# Patient Record
Sex: FEMALE | Race: BLACK OR AFRICAN AMERICAN
[De-identification: names, ages, dates, MRNs, and addresses within clinical notes are randomized per-mention and may not be internally consistent; named-entity substitution may affect disease eponyms.]

---

## 2017-01-18 ENCOUNTER — HOSPITAL ENCOUNTER (EMERGENCY)
Dept: HOSPITAL 25 - ED | Age: 62
LOS: 1 days | Discharge: HOME | End: 2017-01-19
Payer: COMMERCIAL

## 2017-01-18 VITALS — SYSTOLIC BLOOD PRESSURE: 121 MMHG | DIASTOLIC BLOOD PRESSURE: 64 MMHG

## 2017-01-18 DIAGNOSIS — F17.210: ICD-10-CM

## 2017-01-18 DIAGNOSIS — M54.9: ICD-10-CM

## 2017-01-18 DIAGNOSIS — R10.9: ICD-10-CM

## 2017-01-18 DIAGNOSIS — R07.9: Primary | ICD-10-CM

## 2017-01-18 LAB
ALBUMIN SERPL BCG-MCNC: 4 G/DL (ref 3.2–5.2)
ALP SERPL-CCNC: 87 U/L (ref 34–104)
ALT SERPL W P-5'-P-CCNC: 14 U/L (ref 7–52)
AMYLASE SERPL-CCNC: 18 U/L (ref 29–103)
ANION GAP SERPL CALC-SCNC: 8 MMOL/L (ref 2–11)
AST SERPL-CCNC: 13 U/L (ref 13–39)
BUN SERPL-MCNC: 8 MG/DL (ref 6–24)
BUN/CREAT SERPL: 12.7 (ref 8–20)
CALCIUM SERPL-MCNC: 9.1 MG/DL (ref 8.6–10.3)
CHLORIDE SERPL-SCNC: 101 MMOL/L (ref 101–111)
GLOBULIN SER CALC-MCNC: 3.1 G/DL (ref 2–4)
GLUCOSE SERPL-MCNC: 89 MG/DL (ref 70–100)
HCO3 SERPL-SCNC: 28 MMOL/L (ref 22–32)
HCT VFR BLD AUTO: 38 % (ref 35–47)
HGB BLD-MCNC: 12.3 G/DL (ref 12–16)
LIPASE SERPL-CCNC: 11 U/L (ref 11–82)
MAGNESIUM SERPL-MCNC: 1.8 MG/DL (ref 1.9–2.7)
MCH RBC QN AUTO: 26 PG (ref 27–31)
MCHC RBC AUTO-ENTMCNC: 32 G/DL (ref 31–36)
MCV RBC AUTO: 80 FL (ref 80–97)
POTASSIUM SERPL-SCNC: 3.8 MMOL/L (ref 3.5–5)
PROT SERPL-MCNC: 7.1 G/DL (ref 6.4–8.9)
RBC # BLD AUTO: 4.8 10^6/UL (ref 4–5.4)
SODIUM SERPL-SCNC: 137 MMOL/L (ref 133–145)
TROPONIN I SERPL-MCNC: 0 NG/ML (ref ?–0.04)
WBC # BLD AUTO: 10.6 10^3/UL (ref 3.5–10.8)

## 2017-01-18 PROCEDURE — 93005 ELECTROCARDIOGRAM TRACING: CPT

## 2017-01-18 PROCEDURE — 85025 COMPLETE CBC W/AUTO DIFF WBC: CPT

## 2017-01-18 PROCEDURE — 99283 EMERGENCY DEPT VISIT LOW MDM: CPT

## 2017-01-18 PROCEDURE — 96374 THER/PROPH/DIAG INJ IV PUSH: CPT

## 2017-01-18 PROCEDURE — 83735 ASSAY OF MAGNESIUM: CPT

## 2017-01-18 PROCEDURE — 84484 ASSAY OF TROPONIN QUANT: CPT

## 2017-01-18 PROCEDURE — 36415 COLL VENOUS BLD VENIPUNCTURE: CPT

## 2017-01-18 PROCEDURE — 81003 URINALYSIS AUTO W/O SCOPE: CPT

## 2017-01-18 PROCEDURE — 82553 CREATINE MB FRACTION: CPT

## 2017-01-18 PROCEDURE — 96375 TX/PRO/DX INJ NEW DRUG ADDON: CPT

## 2017-01-18 PROCEDURE — 96361 HYDRATE IV INFUSION ADD-ON: CPT

## 2017-01-18 PROCEDURE — 83690 ASSAY OF LIPASE: CPT

## 2017-01-18 PROCEDURE — 80053 COMPREHEN METABOLIC PANEL: CPT

## 2017-01-18 PROCEDURE — 71020: CPT

## 2017-01-18 PROCEDURE — 83605 ASSAY OF LACTIC ACID: CPT

## 2017-01-18 PROCEDURE — 82150 ASSAY OF AMYLASE: CPT

## 2017-01-18 NOTE — RAD
INDICATION: Chest pain between the shoulder blades persistent all day. Chest pressure.



COMPARISON: October 9, 2015 CT abdomen and September 24, 2013 chest radiograph.



TECHNIQUE:  Dual energy PA and routine lateral views of the chest were obtained.



REPORT: Elevated lung volumes with increased AP thoracic diameter. No alveolar

consolidation, focal pulmonary lesion, pleural effusion, pneumothorax. Negative for

cardiomegaly. Unremarkable central pulmonary vasculature. Fusiform midline retrocardiac

density corresponds with a hiatal hernia based on correlation with prior CT.



IMPRESSION: Stigmata of probable chronic obstructive pulmonary disease. No acute

cardiopulmonary process evident.

## 2017-03-20 ENCOUNTER — HOSPITAL ENCOUNTER (EMERGENCY)
Dept: HOSPITAL 25 - ED | Age: 62
Discharge: HOME | End: 2017-03-20
Payer: COMMERCIAL

## 2017-03-20 VITALS — DIASTOLIC BLOOD PRESSURE: 71 MMHG | SYSTOLIC BLOOD PRESSURE: 119 MMHG

## 2017-03-20 DIAGNOSIS — F17.210: ICD-10-CM

## 2017-03-20 DIAGNOSIS — R10.84: Primary | ICD-10-CM

## 2017-03-20 DIAGNOSIS — M54.9: ICD-10-CM

## 2017-03-20 LAB
ALBUMIN SERPL BCG-MCNC: 3.9 G/DL (ref 3.2–5.2)
ALP SERPL-CCNC: 83 U/L (ref 34–104)
ALT SERPL W P-5'-P-CCNC: 14 U/L (ref 7–52)
AMYLASE SERPL-CCNC: 17 U/L (ref 29–103)
ANION GAP SERPL CALC-SCNC: 5 MMOL/L (ref 2–11)
AST SERPL-CCNC: 13 U/L (ref 13–39)
BUN SERPL-MCNC: 10 MG/DL (ref 6–24)
BUN/CREAT SERPL: 15.6 (ref 8–20)
CALCIUM SERPL-MCNC: 9.1 MG/DL (ref 8.6–10.3)
CHLORIDE SERPL-SCNC: 103 MMOL/L (ref 101–111)
GLOBULIN SER CALC-MCNC: 3.2 G/DL (ref 2–4)
GLUCOSE SERPL-MCNC: 100 MG/DL (ref 70–100)
HCO3 SERPL-SCNC: 30 MMOL/L (ref 22–32)
HCT VFR BLD AUTO: 39 % (ref 35–47)
HGB BLD-MCNC: 12.8 G/DL (ref 12–16)
LIPASE SERPL-CCNC: 13 U/L (ref 11–82)
MCH RBC QN AUTO: 26 PG (ref 27–31)
MCHC RBC AUTO-ENTMCNC: 33 G/DL (ref 31–36)
MCV RBC AUTO: 80 FL (ref 80–97)
POTASSIUM SERPL-SCNC: 4.1 MMOL/L (ref 3.5–5)
PROT SERPL-MCNC: 7.1 G/DL (ref 6.4–8.9)
RBC # BLD AUTO: 4.87 10^6/UL (ref 4–5.4)
SODIUM SERPL-SCNC: 138 MMOL/L (ref 133–145)
TROPONIN I SERPL-MCNC: 0.01 NG/ML (ref ?–0.04)
WBC # BLD AUTO: 7.2 10^3/UL (ref 3.5–10.8)

## 2017-03-20 PROCEDURE — 36415 COLL VENOUS BLD VENIPUNCTURE: CPT

## 2017-03-20 PROCEDURE — 99283 EMERGENCY DEPT VISIT LOW MDM: CPT

## 2017-03-20 PROCEDURE — 83690 ASSAY OF LIPASE: CPT

## 2017-03-20 PROCEDURE — 86140 C-REACTIVE PROTEIN: CPT

## 2017-03-20 PROCEDURE — 83605 ASSAY OF LACTIC ACID: CPT

## 2017-03-20 PROCEDURE — 82150 ASSAY OF AMYLASE: CPT

## 2017-03-20 PROCEDURE — 96375 TX/PRO/DX INJ NEW DRUG ADDON: CPT

## 2017-03-20 PROCEDURE — 93005 ELECTROCARDIOGRAM TRACING: CPT

## 2017-03-20 PROCEDURE — 84484 ASSAY OF TROPONIN QUANT: CPT

## 2017-03-20 PROCEDURE — 85025 COMPLETE CBC W/AUTO DIFF WBC: CPT

## 2017-03-20 PROCEDURE — 80053 COMPREHEN METABOLIC PANEL: CPT

## 2017-03-20 PROCEDURE — 81003 URINALYSIS AUTO W/O SCOPE: CPT

## 2017-03-20 PROCEDURE — 96374 THER/PROPH/DIAG INJ IV PUSH: CPT

## 2017-03-20 PROCEDURE — 74174 CTA ABD&PLVS W/CONTRAST: CPT

## 2017-03-20 PROCEDURE — 71275 CT ANGIOGRAPHY CHEST: CPT

## 2017-03-20 NOTE — ED
Raymon ROSS Alok, scribed for Leticia Streeter MD on 03/20/17 at 1308 .





Abdominal Pain/Female





- HPI Summary


HPI Summary: 


60 y/o female presents to the ED with c/o of epigastric and lower quadrant abd 

pain radiating to upper back. Pt states that her symptoms began 1 week ago 

starting with a sharp shoulder pain which worsened 3 days ago to burning abd 

pain and back pain worse than baseline, currently registering at a 10/10 in 

severity. Pt denies any aggravation of her symptoms from deep breaths and 

states that nothing makes her symptoms better/worse. Pt also denies any N/V Pt 

has PMHx of GERD, hyperthyroid, and Fibromyalgia, as well as depression and 

anxiety. Pt denies any medications, recent travels, or PMHx of MI.








- History of Current Complaint


Chief Complaint: EDAbdPain


Stated Complaint: ABD PAIN / BACK PAIN


Time Seen by Provider: 03/20/17 12:18


Hx Obtained From: Patient


Pregnant?: No


Onset/Duration: Gradual Onset, Lasting Days, Still Present


Timing: Constant


Severity Initially: Moderate


Severity Currently: Moderate


Pain Intensity: 10


Pain Scale Used: 0-10 Numeric


Location: Discrete At: RLQ, Discrete At: LLQ, Epigastric, Other - Shoulder


Radiates: Yes


Radiates to: Back


Character: Sharp, Burning


Aggravating Factor(s): Nothing


Alleviating Factor(s): Nothing


Associated Signs and Symptoms: Positive: Back Pain.  Negative: Fever, Nausea, 

Vomiting


Allergies/Adverse Reactions: 


 Allergies











Allergy/AdvReac Type Severity Reaction Status Date / Time


 


Sulfa Drugs Allergy Intermediate Hives Verified 03/20/17 10:53














PMH/Surg Hx/FS Hx/Imm Hx


Endocrine/Hematology History: Reports: Hx Thyroid Disease - HYPOTHYROID


   Denies: Hx Diabetes, Hx Anemia


Cardiovascular History: Reports: Hx Valvular Heart Disease - mitral valve 

prolapse


   Denies: Hx Congestive Heart Failure, Hx Hypertension, Hx Pacemaker/ICD


GI History: Reports: Hx Gastroesophageal Reflux Disease - ON MEDICATION FOR, 

Other GI Disorders - GERD, ? IBS


   Denies: Hx Jaundice


 History: 


   Denies: Hx Dialysis, Hx Renal Disease


Musculoskeletal History: Reports: Hx Arthritis - OSTEO, BACK AND KNEES, Hx 

Orthopedic Injury


Sensory History: Reports: Hx Contacts or Glasses


   Denies: Hx Hearing Aid


Opthamlomology History: Reports: Hx Contacts or Glasses


Neurological History: Reports: Other Neuro Impairments/Disorders - L5-S1 PAIN 

INJECTION


Psychiatric History: Reports: Hx Anxiety - ON MEDICATION FOR, Hx Depression - 

ON MEDICATION FOR


   Denies: Hx Panic Disorder





- Surgical History


Surgery Procedure, Year, and Place: SEVERAL FOOT SURGERIES-([facility]) [facility] 

REPAIR- Lt [facility] -Partial thyroidectomy (cyst) Rt HAND -


Hx Anesthesia Reactions: No


Infectious Disease History: No


Infectious Disease History: 


   Denies: Traveled Outside the [place] in Last 30 Days





- Family History


Known Family History: Positive: Renal Disease, Other - arthritis, CA





- Social History


Lives: Alone


Alcohol Use: Occasionally


Alcohol Amount: 2-3 beers a couple times per week- pt seems evasive


Substance Use Type: Reports: Prescribed


Substance Use Comment - Amount & Last Used: Tramadol ER


Hx Tobacco Use: Yes


Smoking Status (MU): Heavy Every Day Tobacco Smoker


Type: Cigarettes


Amount Used/How Often: 1 ppd


Have You Smoked in the Last Year: Yes





Review of Systems


Negative: Fever


Positive: Abdominal Pain.  Negative: Vomiting, Nausea


Positive: Other - Back Pain, Shoulder Pain


All Other Systems Reviewed And Are Negative: Yes





Physical Exam


Triage Information Reviewed: Yes


Vital Signs On Initial Exam: 


 Initial Vitals











Temp Pulse Resp BP Pulse Ox


 


 96.2 F   93   20   154/80   97 


 


 03/20/17 10:54  03/20/17 10:54  03/20/17 10:54  03/20/17 10:54  03/20/17 10:54











Vital Signs Reviewed: Yes


Appearance: Positive: Well-Appearing, No Pain Distress


Skin: Positive: Warm, Skin Color Reflects Adequate Perfusion, Dry


Eyes: Positive: EOMI, ROSAMARIA


ENT: Positive: Pharynx normal, TMs normal


Neck: Positive: Supple, Nontender


Respiratory/Lung Sounds: Positive: Clear to Auscultation, Breath Sounds 

Present.  Negative: Rales, Rhonchi, Wheezes


Cardiovascular: Positive: RRR.  Negative: Murmur, Rub, Other - Gallops


Abdomen Description: Positive: Soft, Other: - Diffuse Belly Pain


Bowel Sounds: Positive: Present


Musculoskeletal: Positive: Strength/ROM Intact.  Negative: Edema Left, Edema 

Right


Neurological: Positive: Sensory/Motor Intact, Alert, Oriented to Person Place, 

Time, CN Intact II-III


Psychiatric: Positive: Affect/Mood Appropriate





Diagnostics





- Vital Signs


 Vital Signs











  Temp Pulse Resp BP Pulse Ox


 


 03/20/17 10:54  96.2 F  93  20  154/80  97














- Laboratory


Lab Results: 


 Lab Results











  03/20/17 03/20/17 03/20/17 Range/Units





  12:43 12:43 12:43 


 


WBC  7.2    (3.5-10.8)  10^3/ul


 


RBC  4.87    (4.0-5.4)  10^6/ul


 


Hgb  12.8    (12.0-16.0)  g/dl


 


Hct  39    (35-47)  %


 


MCV  80    (80-97)  fL


 


MCH  26 L    (27-31)  pg


 


MCHC  33    (31-36)  g/dl


 


RDW  17 H    (10.5-15)  %


 


Plt Count  226    (150-450)  10^3/ul


 


MPV  8    (7.4-10.4)  um3


 


Neut % (Auto)  47.4    (38-83)  %


 


Lymph % (Auto)  43.9    (25-47)  %


 


Mono % (Auto)  6.3    (1-9)  %


 


Eos % (Auto)  1.6    (0-6)  %


 


Baso % (Auto)  0.8    (0-2)  %


 


Absolute Neuts (auto)  3.4    (1.5-7.7)  10^3/ul


 


Absolute Lymphs (auto)  3.2    (1.0-4.8)  10^3/ul


 


Absolute Monos (auto)  0.5    (0-0.8)  10^3/ul


 


Absolute Eos (auto)  0.1    (0-0.6)  10^3/ul


 


Absolute Basos (auto)  0.1    (0-0.2)  10^3/ul


 


Absolute Nucleated RBC  0    10^3/ul


 


Nucleated RBC %  0    


 


Sodium   138   (133-145)  mmol/L


 


Potassium   4.1   (3.5-5.0)  mmol/L


 


Chloride   103   (101-111)  mmol/L


 


Carbon Dioxide   30   (22-32)  mmol/L


 


Anion Gap   5   (2-11)  mmol/L


 


BUN   10   (6-24)  mg/dL


 


Creatinine   0.64   (0.51-0.95)  mg/dL


 


Est GFR ( Amer)   121.7   (>60)  


 


Est GFR (Non-Af Amer)   94.7   (>60)  


 


BUN/Creatinine Ratio   15.6   (8-20)  


 


Glucose   100   ()  mg/dL


 


Lactic Acid    1.3  (0.5-2.0)  mmol/L


 


Calcium   9.1   (8.6-10.3)  mg/dL


 


Total Bilirubin   0.40   (0.2-1.0)  mg/dL


 


AST   13   (13-39)  U/L


 


ALT   14   (7-52)  U/L


 


Alkaline Phosphatase   83   ()  U/L


 


Troponin I   0.01   (<0.04)  ng/mL


 


C-Reactive Protein   9.27 H   (< 5.00)  mg/L


 


Total Protein   7.1   (6.4-8.9)  g/dL


 


Albumin   3.9   (3.2-5.2)  g/dL


 


Globulin   3.2   (2-4)  g/dL


 


Albumin/Globulin Ratio   1.2   (1-3)  


 


Amylase   17 L   ()  U/L


 


Lipase   13   (11.0-82.0)  U/L


 


Urine Color     


 


Urine Appearance     


 


Urine pH     (5-9)  


 


Ur Specific Gravity     (1.010-1.030)  


 


Urine Protein     (Negative)  


 


Urine Ketones     (Negative)  


 


Urine Blood     (Negative)  


 


Urine Nitrate     (Negative)  


 


Urine Bilirubin     (Negative)  


 


Urine Urobilinogen     (Negative)  


 


Ur Leukocyte Esterase     (Negative)  


 


Urine Glucose     (Negative)  














  03/20/17 Range/Units





  15:43 


 


WBC   (3.5-10.8)  10^3/ul


 


RBC   (4.0-5.4)  10^6/ul


 


Hgb   (12.0-16.0)  g/dl


 


Hct   (35-47)  %


 


MCV   (80-97)  fL


 


MCH   (27-31)  pg


 


MCHC   (31-36)  g/dl


 


RDW   (10.5-15)  %


 


Plt Count   (150-450)  10^3/ul


 


MPV   (7.4-10.4)  um3


 


Neut % (Auto)   (38-83)  %


 


Lymph % (Auto)   (25-47)  %


 


Mono % (Auto)   (1-9)  %


 


Eos % (Auto)   (0-6)  %


 


Baso % (Auto)   (0-2)  %


 


Absolute Neuts (auto)   (1.5-7.7)  10^3/ul


 


Absolute Lymphs (auto)   (1.0-4.8)  10^3/ul


 


Absolute Monos (auto)   (0-0.8)  10^3/ul


 


Absolute Eos (auto)   (0-0.6)  10^3/ul


 


Absolute Basos (auto)   (0-0.2)  10^3/ul


 


Absolute Nucleated RBC   10^3/ul


 


Nucleated RBC %   


 


Sodium   (133-145)  mmol/L


 


Potassium   (3.5-5.0)  mmol/L


 


Chloride   (101-111)  mmol/L


 


Carbon Dioxide   (22-32)  mmol/L


 


Anion Gap   (2-11)  mmol/L


 


BUN   (6-24)  mg/dL


 


Creatinine   (0.51-0.95)  mg/dL


 


Est GFR ( Amer)   (>60)  


 


Est GFR (Non-Af Amer)   (>60)  


 


BUN/Creatinine Ratio   (8-20)  


 


Glucose   ()  mg/dL


 


Lactic Acid   (0.5-2.0)  mmol/L


 


Calcium   (8.6-10.3)  mg/dL


 


Total Bilirubin   (0.2-1.0)  mg/dL


 


AST   (13-39)  U/L


 


ALT   (7-52)  U/L


 


Alkaline Phosphatase   ()  U/L


 


Troponin I   (<0.04)  ng/mL


 


C-Reactive Protein   (< 5.00)  mg/L


 


Total Protein   (6.4-8.9)  g/dL


 


Albumin   (3.2-5.2)  g/dL


 


Globulin   (2-4)  g/dL


 


Albumin/Globulin Ratio   (1-3)  


 


Amylase   ()  U/L


 


Lipase   (11.0-82.0)  U/L


 


Urine Color  Yellow  


 


Urine Appearance  Clear  


 


Urine pH  6.0  (5-9)  


 


Ur Specific Gravity  > 1.060 H  (1.010-1.030)  


 


Urine Protein  Negative  (Negative)  


 


Urine Ketones  Negative  (Negative)  


 


Urine Blood  Negative  (Negative)  


 


Urine Nitrate  Negative  (Negative)  


 


Urine Bilirubin  Negative  (Negative)  


 


Urine Urobilinogen  Negative  (Negative)  


 


Ur Leukocyte Esterase  Negative  (Negative)  


 


Urine Glucose  Negative  (Negative)  











Result Diagrams: 


 03/20/17 12:43





 03/20/17 12:43


Lab Statement: Any lab studies that have been ordered have been reviewed, and 

results considered in the medical decision making process.





- CT


  ** Chest/Abd/Pelvis CT


CT Interpretation: Positive (See Comments) - CHEST IMPRESSION:  1. Normal 

diameter thoracic aorta with minimal atherosclerotic plaque. Negative for 

dissection of the thoracic aorta. 2. Large RIGHT paratracheal lymph node 

measuring up to 2.8 cm short axis diameter and up to 4.4 cm cephalocaudal.  

This lymph node is new compared with a CT exam of the neck from March 27, 2015. 

Consider small cell lung carcinoma as well as metastatic lesions and 

lymphoproliferative disorders. ABDOMEN PELVIS IMPRESSION:  1. Negative for 

aneurysm or dissection of the abdominal aorta. 2. Negative for lymphadenopathy. 

3. Moderate sliding-type hiatal hernia without change. Mild colonic 

diverticulosis without evidence for acute diverticulitis.


CT Interpretation Completed By: Radiologist





- EKG


  ** 1231


Cardiac Rate: NL


EKG Rhythm: Sinus Rhythm - 82 bpm


Ectopy: PVCs - One


EKG Interpretation: No Q-waves, No ST Elevation


EKG Comparison: No Significant Change - Since 1/18/16





Abdominal Pain Fem Course/Dx





- Course


Course Of Treatment: 62 yo female with pain she described as initially between 

her shoulder blades a few days ago and now as belly pain radiating to back that 

moves to different places. her pain is a 10/10 she already had an u/s of the 

abdominal aorta that was negative.  CTA chest/abd/pelvis shows rt paratracheal 

node. Pt is aware of this finding and so is Dr. Chaudhry, her pain seems likely 

unrelated and Dr. Chaudhry is planning on seeing the pt as an outpt in the next 1-2 

days. She will be going with a short course of pain meds





- Diagnoses


Differential Diagnosis: Positive: Other - Incidental finding: Right 

paratracheal mass


Provider Diagnoses: 


 Abdominal pain, paratracheal lymph node








- Provider Notifications


Discussed Care Of Patient With: Dr Chaudhry @ [id number]





Discharge





- Discharge Plan


Condition: Stable


Disposition: HOME


Prescriptions: 


oxyCODONE/Acetamin 5/325 MG* [Percocet 5/325 TAB*] 1 tab PO Q8H PRN #10 tab MDD 

3


 PRN Reason: Pain


Referrals: 


Serjio Chaudhry MD [Primary Care Provider] - 





The documentation as recorded by the Raymon shirley Alok accurately reflects 

the service I personally performed and the decisions made by me, Leticia Streeter MD.

## 2017-03-20 NOTE — RAD
INDICATION: Back pain radiating to the chest and abdomen. Hypertension and tobacco use.



COMPARISON: January 18, 2017 chest radiograph and October 09, 2015 abdomen CT.



TECHNIQUE: Multidetector CT images were obtained from the lung apices to the ischial

tuberosities with 100 mL Omnipaque 350 IV contrast.  No oral contrast administered.

Multiplanar reformation with maximum intensity projection and 3-D arterial volume

rendering.



CHEST REPORT: Rarefaction of interstitial markings consistent with emphysema. No focal

pulmonary lesions evident. Negative for pleural effusions. Small fat-containing LEFT

posterior medial diaphragmatic hernia without change.



Large RIGHT paratracheal lymph node measuring up to 2.8 cm short axis diameter and up to

4.4 cm cephalocaudal. Upper normal 1 cm short axis diameter RIGHT suprahilar lymph node.

Negative for axillary or visible supraclavicular adenopathy. Negative for cardiomegaly or

pericardial effusion. Normal diameter thoracic aorta with minimal atherosclerotic plaque.

Negative for dissection of the thoracic aorta. Variant common origin of the LEFT common

carotid and RIGHT brachiocephalic arteries. The common carotid arteries are tortuous and

ascend in the respective para midline positions at the level of the larynx.



Negative for suspicious thoracic osseous lesions.



CHEST IMPRESSION: 

1. Normal diameter thoracic aorta with minimal atherosclerotic plaque. Negative for

dissection of the thoracic aorta.

2. Large RIGHT paratracheal lymph node measuring up to 2.8 cm short axis diameter and up

to 4.4 cm cephalocaudal.  This lymph node is new compared with a CT exam of the neck from

March 27, 2015. Consider small cell lung carcinoma as well as metastatic lesions and

lymphoproliferative disorders.



ABDOMEN PELVIS REPORT: Assessment of the abdominal pelvic viscera is limited due to

arterial phase only series. No CT abnormality of the liver, gallbladder, pancreas, spleen

evident accounting for arterial phase enhancement.



Moderate sliding-type hiatal hernia without change. No additional CT abnormality of the

upper GI. No CT abnormality of the small bowel loops or appendix visualized at the LEFT

lower quadrant reference axial images 185-197. Mild colonic diverticulosis without

evidence for acute diverticulitis. Negative for ascites or free air. Small fat-containing

supraumbilical ventral hernia without evidence for inflammatory change or increase in size

compared with the prior exam. Unchanged small fat-containing umbilical hernia without

acute finding.



Normal adrenal glands. Unremarkable kidneys with symmetric cortical enhancement. Negative

for hydronephrosis. Unremarkable ureters and urinary bladder. No abnormality of the uterus

or adnexal regions evident.



Negative for lymphadenopathy. Normal diameter abdominal aorta and iliac arteries with

minimal atherosclerotic plaque. Negative for aortic dissection.



Negative for suspicious focal osseous lesions. Bilateral L5 spondylolysis and grade 2-3

L5-S1 anterolisthesis without gross change. Advanced degenerative spondylosis at L3-L4 and

L5-S1 with associated reactive endplate change without significant interval change. 



ABDOMEN PELVIS IMPRESSION: 

1. Negative for aneurysm or dissection of the abdominal aorta.

2. Negative for lymphadenopathy.

3. Moderate sliding-type hiatal hernia without change. Mild colonic diverticulosis without

evidence for acute diverticulitis.



Results discussed with Dr. Streeter 3/20/2017 2:32 PM EDT

## 2017-03-24 ENCOUNTER — HOSPITAL ENCOUNTER (OUTPATIENT)
Dept: HOSPITAL 25 - ED | Age: 62
Setting detail: OBSERVATION
LOS: 1 days | Discharge: HOME | End: 2017-03-25
Attending: INTERNAL MEDICINE | Admitting: INTERNAL MEDICINE
Payer: COMMERCIAL

## 2017-03-24 DIAGNOSIS — R07.9: Primary | ICD-10-CM

## 2017-03-24 DIAGNOSIS — Z79.899: ICD-10-CM

## 2017-03-24 DIAGNOSIS — F41.9: ICD-10-CM

## 2017-03-24 DIAGNOSIS — M79.7: ICD-10-CM

## 2017-03-24 DIAGNOSIS — G47.00: ICD-10-CM

## 2017-03-24 DIAGNOSIS — Z88.2: ICD-10-CM

## 2017-03-24 DIAGNOSIS — K44.9: ICD-10-CM

## 2017-03-24 DIAGNOSIS — R10.9: ICD-10-CM

## 2017-03-24 DIAGNOSIS — F17.210: ICD-10-CM

## 2017-03-24 DIAGNOSIS — F32.9: ICD-10-CM

## 2017-03-24 DIAGNOSIS — R55: ICD-10-CM

## 2017-03-24 DIAGNOSIS — R94.31: ICD-10-CM

## 2017-03-24 DIAGNOSIS — R59.1: ICD-10-CM

## 2017-03-24 DIAGNOSIS — E03.9: ICD-10-CM

## 2017-03-24 DIAGNOSIS — K21.9: ICD-10-CM

## 2017-03-24 DIAGNOSIS — E66.9: ICD-10-CM

## 2017-03-24 DIAGNOSIS — R79.89: ICD-10-CM

## 2017-03-24 DIAGNOSIS — R61: ICD-10-CM

## 2017-03-24 LAB
ALBUMIN SERPL BCG-MCNC: 3.6 G/DL (ref 3.2–5.2)
ALP SERPL-CCNC: 93 U/L (ref 34–104)
ALT SERPL W P-5'-P-CCNC: 16 U/L (ref 7–52)
ANION GAP SERPL CALC-SCNC: 8 MMOL/L (ref 2–11)
AST SERPL-CCNC: 18 U/L (ref 13–39)
BUN SERPL-MCNC: 11 MG/DL (ref 6–24)
BUN/CREAT SERPL: 12.8 (ref 8–20)
CALCIUM SERPL-MCNC: 8.8 MG/DL (ref 8.6–10.3)
CHLORIDE SERPL-SCNC: 104 MMOL/L (ref 101–111)
FERRITIN SERPL IA-MCNC: < 10 NG/ML (ref 11–307)
GLOBULIN SER CALC-MCNC: 2.9 G/DL (ref 2–4)
GLUCOSE SERPL-MCNC: 129 MG/DL (ref 70–100)
HCO3 SERPL-SCNC: 26 MMOL/L (ref 22–32)
HCT VFR BLD AUTO: 36 % (ref 35–47)
HGB BLD-MCNC: 11.5 G/DL (ref 12–16)
MCH RBC QN AUTO: 26 PG (ref 27–31)
MCHC RBC AUTO-ENTMCNC: 32 G/DL (ref 31–36)
MCV RBC AUTO: 81 FL (ref 80–97)
POTASSIUM SERPL-SCNC: 4 MMOL/L (ref 3.5–5)
PROT SERPL-MCNC: 6.5 G/DL (ref 6.4–8.9)
RBC # BLD AUTO: 4.44 10^6/UL (ref 4–5.4)
SODIUM SERPL-SCNC: 138 MMOL/L (ref 133–145)
TROPONIN I SERPL-MCNC: 0 NG/ML (ref ?–0.04)
TSH SERPL-ACNC: 0.18 MCIU/ML (ref 0.34–5.6)
VIT B12 SERPL-MCNC: 908 PG/ML (ref 180–914)
WBC # BLD AUTO: 11.2 10^3/UL (ref 3.5–10.8)

## 2017-03-24 PROCEDURE — 85025 COMPLETE CBC W/AUTO DIFF WBC: CPT

## 2017-03-24 PROCEDURE — 36415 COLL VENOUS BLD VENIPUNCTURE: CPT

## 2017-03-24 PROCEDURE — 87086 URINE CULTURE/COLONY COUNT: CPT

## 2017-03-24 PROCEDURE — 85027 COMPLETE CBC AUTOMATED: CPT

## 2017-03-24 PROCEDURE — 81015 MICROSCOPIC EXAM OF URINE: CPT

## 2017-03-24 PROCEDURE — 93017 CV STRESS TEST TRACING ONLY: CPT

## 2017-03-24 PROCEDURE — 82728 ASSAY OF FERRITIN: CPT

## 2017-03-24 PROCEDURE — G0378 HOSPITAL OBSERVATION PER HR: HCPCS

## 2017-03-24 PROCEDURE — 84443 ASSAY THYROID STIM HORMONE: CPT

## 2017-03-24 PROCEDURE — 99283 EMERGENCY DEPT VISIT LOW MDM: CPT

## 2017-03-24 PROCEDURE — 76705 ECHO EXAM OF ABDOMEN: CPT

## 2017-03-24 PROCEDURE — 99406 BEHAV CHNG SMOKING 3-10 MIN: CPT

## 2017-03-24 PROCEDURE — 93005 ELECTROCARDIOGRAM TRACING: CPT

## 2017-03-24 PROCEDURE — 84481 FREE ASSAY (FT-3): CPT

## 2017-03-24 PROCEDURE — 80053 COMPREHEN METABOLIC PANEL: CPT

## 2017-03-24 PROCEDURE — 0DJ08ZZ INSPECTION OF UPPER INTESTINAL TRACT, VIA NATURAL OR ARTIFICIAL OPENING ENDOSCOPIC: ICD-10-PCS | Performed by: INTERNAL MEDICINE

## 2017-03-24 PROCEDURE — 78452 HT MUSCLE IMAGE SPECT MULT: CPT

## 2017-03-24 PROCEDURE — 84484 ASSAY OF TROPONIN QUANT: CPT

## 2017-03-24 PROCEDURE — 96361 HYDRATE IV INFUSION ADD-ON: CPT

## 2017-03-24 PROCEDURE — 81003 URINALYSIS AUTO W/O SCOPE: CPT

## 2017-03-24 PROCEDURE — 70450 CT HEAD/BRAIN W/O DYE: CPT

## 2017-03-24 PROCEDURE — A9502 TC99M TETROFOSMIN: HCPCS

## 2017-03-24 PROCEDURE — 86140 C-REACTIVE PROTEIN: CPT

## 2017-03-24 PROCEDURE — 82607 VITAMIN B-12: CPT

## 2017-03-24 PROCEDURE — 85652 RBC SED RATE AUTOMATED: CPT

## 2017-03-24 PROCEDURE — 96360 HYDRATION IV INFUSION INIT: CPT

## 2017-03-24 PROCEDURE — 84439 ASSAY OF FREE THYROXINE: CPT

## 2017-03-24 RX ADMIN — PREGABALIN SCH: 50 CAPSULE ORAL at 16:48

## 2017-03-24 RX ADMIN — SODIUM CHLORIDE SCH MLS/HR: 900 IRRIGANT IRRIGATION at 14:41

## 2017-03-24 RX ADMIN — PREGABALIN SCH: 50 CAPSULE ORAL at 21:39

## 2017-03-24 NOTE — RAD
Indication: Abdominal pain.



Real-time sonography of the right upper quadrant was performed.



The liver is lobulated in contour. There are no focal lesions or intrahepatic duct

dilatation noted. Common duct measures 5 mm. The gallbladder demonstrates gallbladder

polyp in the nondependent portion of the gallbladder. No pericholecystic fluid or wall

thickening is identified.



The right kidney measures 10.2 x 4.4 x 5.4 cm with no hydronephrosis.



The pancreas head, neck and proximal body demonstrates no mass or pancreatic duct

dilatation.



Aorta and inferior vena cava are unremarkable.



IMPRESSION: Probable gallbladder polyp without evidence of pericholecystic fluid or wall

thickening.

## 2017-03-24 NOTE — HP
CC:  Serjio Chaudhry MD

 

HISTORY AND PHYSICAL:

 

DATE OF ADMISSION:  17

 

HISTORY OF PRESENT ILLNESS:  Deepali Kang is a 61-year-old woman admitted with 
chest and abdominal pain and near syncope.  She also has an enlarged lymph node 
on the CT of her chest.  The patient has been having recent upper abdominal 
pain going into her chest, shoulder blades, rib cage.  She was visiting her 
sister in [place] and it got worse after she came home, so she came to the 
emergency room on 17.  At that time, she underwent a workup, which 
included a CTA of the chest, abdomen and pelvis.  This showed an enlarged right 
paratracheal lymph node, but otherwise did not reveal the source of her pain.  
She had already had an ultrasound of her abdomen ordered that was negative.  
She was discharged on Percocet, which she did not take.  She has continued to 
have pain since then.  Yesterday evening she ate supper around 6 to 6:30 p.m.  
She tried to sleep around 8 to 9 p.m. and was uncomfortable, could not sleep.  
Around 11:30 p.m., she got up and went into the living room.  While there, she 
turned and all of a sudden felt like she was going to pass out.  This made her 
feel that she should come to the emergency room.  This was also accompanied by 
terrible frontal headache when she lay down before she called the ambulance.  
She has not been sleeping well because of the pain she has been having.  She 
said in the last few days, she has had a total of 7 hours of sleep.  She has 
never, however, felt dizzy like she was going to pass out before.

 

PAST MEDICAL HISTORY:  Significant for the following medical problems:

 

1.  Hypothyroidism, status post a partial thyroidectomy.

2.  Fibromyalgia.

3.  Depression.

4.  Hyperhidrosis.

5.  Tobacco abuse.

6.  History of vitamin D deficiency, resolved.

7.  GERD.

8.  Osteoarthritis.

9.  Obesity.

10.  History of anxiety.

 

PRIOR SURGERIES:  Include:

 

1.  Hemithyroidectomy.

2.  Foot surgery for bunions and hammertoes.

3.  Surgery on her right thumb, excision of a trapezium and proximal third of 
the trapezoid with suture suspension arthroplasty.

4.  Left knee arthroscopy.

5.  T and A, age 19.

 

CURRENT MEDICATIONS:

1.  Levothyroxine 200 mcg daily.

2.  Bupropion extended release 150 mg daily.

3.  Alprazolam 0.5 mg p.r.n. anxiety.

4.  Omeprazole 20 mg (? dose) once a day.

5.  Cymbalta (duloxetine) 60 mg once a day.

6.  Multivitamins 1 a day.

7.  Vitamin D drops 6 to 7 drops daily.

8.  She has taken ibuprofen in the past ?taking now.

9.  Lyrica 50 mg three times a day.

10.  Tramadol  mg once a day. 

 

ALLERGIES:  SULFA causes hives.

 

FAMILY HISTORY:  Father  at age 83.  He had a history of dementia, 
rheumatoid arthritis.  He had a coccygeal ulcer, which did not heal.  He had a 
history of depression.  Mother had kidney failure, type 2 diabetes and is on 
dialysis, had a history of hypertension.  She has 3 siblings.  One sister has 
lupus.  All her siblings have arthritis and depression.  She has 2 children in 
good health.

 

HABITS:  Almost a pack of cigarettes a day since age 15 aside from a 2- year 
hiatus.  EtOH; 2 drinks a week.  Caffeine; drinks some ice tea, chocolate.

 

SOCIAL AND PERSONAL HISTORY:  The patient lives alone.  She has 2 adult 
children in the area, grandchildren.  She works at the  at the [employer] 
department, which is a stressful job.

 

REVIEW OF SYSTEMS:  Generally, she has not been feeling well.  She is not 
sleeping because of the pain.  She is not physically active.  She does do some 
walking, but no formal exercise.  She denied fevers, chills.  She always sweats 
a lot.  Her appetite is good.  Her weight has not changed.  Skin:  Negative.  
HEENT:  She has felt some discomfort in her throat.  Nodes:  Negative.  Heme:  
She is always bruised easily.  Breasts:  Negative.  Endocrine:  See above.  
Respiratory:  She had some dyspnea with palpitation she experienced in the 
ambulance.  Cardiovascular: See above.  GI:  See above.  Her bowel movements 
are normal.  She does feel somewhat queasy now, but has not up until now.  :  
Negative.  GYN:  Negative. Musculoskeletal:  Everything is achy due to her 
fibromyalgia.  Neuro:  See above. Psychiatric:  History of anxiety and 
depression.

 

                               PHYSICAL EXAMINATION

 

GENERAL:  She is a middle-aged -American woman in no acute distress.  
When I saw her, she was asleep on the stretcher in the emergency room.

 

VITAL SIGNS:  Initially in the emergency room, blood pressure 129/68, pulse 94, 
respirations 20, temperature 98.3, O2 sat 95%.  Most recent vital signs; blood 
pressure 108/52, pulse 78, respirations 17, temperature afebrile, O2 sat 96%.

 

HEENT:  Atraumatic, normocephalic.  Full EOMs.  TMs are unremarkable.  Mouth: 
Pharynx shows teeth in poor repair.  Several teeth are missing and some teeth 
are broken off.

 

NODES:  Without adenopathy.

 

CHEST:  Clear.

 

HEART:  Normal S1, S2.  There are no murmurs, gallops, or rubs.

 

ABDOMEN:  Soft, with tenderness in the epigastric right upper quadrant and 
right lower quadrant areas without rebound or guarding.  There are no masses, 
organomegaly.  Bowel sounds are present.

 

EXTREMITIES:  Without cyanosis, clubbing, or edema.

 

NEUROLOGIC:  She is alert, oriented.  There are no focal or lateralizing signs.

 

SKIN:  Warm and dry.

 

 DIAGNOSTIC STUDIES/LAB DATA:  CBC:  WBC 11.2, H and H 11.5/36, MCV 81, ,
000. ESR 23.  Chemistries:  Sodium 138, potassium 4, chloride 104, CO2 26, BUN 
and creatinine 11/0.86, glucose 129, calcium 8.8.  Troponin 0.00 at 00:25 and 
0.00 at 7:10.  In review of her labs done during her ER visit on 17, she 
had a normal white count then.  Her chemistries at that point with a 
comprehensive metabolic panel showed a CRP that was mildly elevated at 9.27, 
amylase and lipase were normal.  Lactic acid was normal.  LFTs were normal.  
Urinalysis showed an elevated specific gravity of 1.060.  Otherwise normal. TSH 
is pending. 

 

IMPRESSION:  The patient with upper abdominal and chest symptoms. This could be 
related to an ulcer or gastroesophageal reflux disease.  CT was unrevealing.  I 
have spoken with Dr. Zheng and he agrees to do an EGD on her.  I am concerned 
about the enlarged lymph node.  I have discussed this with her.  I have 
discussed it also with Dr. Joshua of Radiology. Near-syncope could be a 
vagovagal event related to pain.  

 

PLAN:  I will admit her to the telemetry unit. The plan is see how the rest of 
the workup goes and consider a biopsy.  Dr. Joshua  will talk to 
Interventional Radiology to see whether it could be approached by them.  
Otherwise, she might need a transbronchial biopsy or a mediastinoscopy.  I can 
also run this by the oncologist.  I do want to rule out coronary disease and so 
we will set her up for a nuclear stress test today.  I have informed the 
cardiologist about this.  She is a full code.  I will not put her on heparin 
for DVT prophylaxis as she will be ambulatory and will be undergoing procedures 
and do not want to invoke a risk of bleeding at this point.

 

 

 

[id number]/CPS #: [id number]

MTDD

## 2017-03-24 NOTE — RAD
HISTORY: Chest pain, dizziness



COMPARISONS: August 21, 2014



TECHNIQUE: A 1 day stress/rest myocardial perfusion study was performed, with exercise

stress. The exercise portion was performed using the Geovany protocol, for a total METs of

7. The stress portion was monitored by Dr. Roche. Gated SPECT imaging was performed, with

CT-based attenuation correction



DOSE:

Stress: Technetium 99m tetrofosmin, 26.2 millicuries, injected at 1:52 PM on March 24, 2017

Rest: Technetium 99m tetrofosmin, 10.32 millicuries, injected at 10:45 AM on March 24, 2017

Pharmacologic agent: None



FINDINGS:



CARDIAC MONITORING: No definitive EKG changes of ischemia with exercise



EF: 78 %

TID: 1.15



MOTION: Normal motion, with normal wall thickening.



PERFUSION: There are no fixed or reversible perfusion defects.



OTHER: None



IMPRESSION: NO FIXED OR REVERSIBLE PERFUSION DEFECTS



ASSESSMENT: LOW RISK. Based on imaging criteria from ACC/AHA 2002. Guideline Update for

the Management of Patient's with Chronic Stable Angina, table 23. Noninvasive Risk

Stratification.

## 2017-03-24 NOTE — ED
HPI Cardiac





- HPI Summary


HPI Summary: 





The patient is a 61 year old female presenting to ED for complaint of 

lightheadedness while standing upright getting ready for bed. Admits to 

development of progressive mid frontal headache described as 4/10 throbbing 

sensation non-radiating. Reports associated photophobia. Ongoing epigastric 

pain radiating to mid scapula, left hip pain radiating to left ankle. Denies 

fever, diaphoresis, nasal symptoms, sore throat, cough, shortness of breath, 

dyspnea on exertion, chest pain, palpitations, syncope, nausea, vomiting, 

diarrhea, change in voiding, hematuria, peripheral edema, unilateral calf pain. 

History of GERD, hypothyroidism. Denies CAD, HTN, HLP, DM, CKD, pulmonary 

disease. FH father  from septic complications of sacral ulcer and 

mother  from ESRD. PCP Dr. Chaudhry





SH: Current smoker. Denies alcohol or recreational substance use. 





- History of Current Complaint


Chief Complaint: EDDizziness


Stated Complaint: DIZZINESS


Time Seen by Provider: 17 01:06





- Allergy/Home Medications


Allergies/Adverse Reactions: 


 Allergies











Allergy/AdvReac Type Severity Reaction Status Date / Time


 


Sulfa Drugs Allergy Intermediate Hives Verified 17 10:53














PMH/Surg Hx/FS Hx/Imm Hx


Endocrine/Hematology History: Reports: Hx Thyroid Disease - HYPOTHYROID


   Denies: Hx Diabetes, Hx Anemia


Cardiovascular History: Reports: Hx Valvular Heart Disease - mitral valve 

prolapse


   Denies: Hx Congestive Heart Failure, Hx Hypertension, Hx Pacemaker/ICD


GI History: Reports: Hx Gastroesophageal Reflux Disease - ON MEDICATION FOR, 

Other GI Disorders - GERD, ? IBS


   Denies: Hx Jaundice


 History: 


   Denies: Hx Dialysis, Hx Renal Disease


Musculoskeletal History: Reports: Hx Arthritis - OSTEO, BACK AND KNEES, Hx 

Orthopedic Injury


Sensory History: Reports: Hx Contacts or Glasses


   Denies: Hx Hearing Aid


Opthamlomology History: Reports: Hx Contacts or Glasses


Neurological History: Reports: Other Neuro Impairments/Disorders - L5-S1 PAIN 

INJECTION


Psychiatric History: Reports: Hx Anxiety - ON MEDICATION FOR, Hx Depression - 

ON MEDICATION FOR


   Denies: Hx Panic Disorder





- Surgical History


Surgery Procedure, Year, and Place: SEVERAL FOOT SURGERIES-([facility]) [facility] 

REPAIR- Lt [facility] -Partial thyroidectomy (cyst) Rt HAND -


Hx Anesthesia Reactions: No





- Family History


Known Family History: Positive: Renal Disease, Other - arthritis, CA





- Social History


Alcohol Use: Occasionally


Alcohol Amount: 2-3 beers a couple times per week- pt seems evasive


Substance Use Type: Reports: Prescribed


Substance Use Comment - Amount & Last Used: Tramadol ER


Hx Tobacco Use: Yes


Smoking Status (MU): Heavy Every Day Tobacco Smoker


Type: Cigarettes


Amount Used/How Often: 1 ppd


Have You Smoked in the Last Year: Yes





Review of Systems


Constitutional: Negative


Negative: Fever, Chills, Skin Diaphoresis


Positive: Photophobia.  Negative: Blurred Vision, Diplopia


ENT: Negative


Negative: Sore Throat, Nasal Discharge


Positive: Other - lightheaded.  Negative: Palpitations, Chest Pain


Respiratory: Negative


Negative: Shortness Of Breath, Cough


Positive: Abdominal Pain.  Negative: Vomiting, Diarrhea, Nausea


Musculoskeletal: Negative


Positive: Arthralgia


Positive: Headache.  Negative: Weakness, Paresthesia, Numbness, Syncope


All Other Systems Reviewed And Are Negative: Yes





Physical Exam


Triage Information Reviewed: Yes


Vital Signs Reviewed: Yes


Appearance: Positive: Well-Appearing, No Pain Distress, Well-Nourished


Skin: Positive: Warm, Skin Color Reflects Adequate Perfusion, Dry


Head/Face: Positive: Normal Head/Face Inspection


Eyes: Positive: Normal, EOMI, ROSAMARIA, Conjunctiva Clear


ENT: Positive: Normal ENT inspection, Hearing grossly normal, Pharynx normal, 

TMs normal


Neck: Positive: Supple, Nontender, No Lymphadenopathy


Respiratory/Lung Sounds: Positive: Clear to Auscultation, Breath Sounds 

Present.  Negative: Decreased Breath Sounds, Rales, Rhonchi, Wheezes, Unable to 

speak in full sentences


Cardiovascular: Positive: Normal - radial pulse 2+, RRR, S1, S2.  Negative: 

Murmur, Rub, Leg Edema Left, Leg Edema Right, S3


Abdomen Description: Positive: No Organomegaly, Soft, Other: - mild epigastric 

tenderness.  Negative: CVA Tenderness (R), CVA Tenderness (L), Distended, 

Guarding, Hepatomegaly, Peritoneal Signs, Splenomegaly


Bowel Sounds: Positive: Present


Musculoskeletal: Positive: Normal, Strength/ROM Intact - 5/5/ muscle strength UE

/LE bilaterally; no drift, Other - no midline or paraspinous thoracic or lumbar 

tenderness


Neurological: Positive: Normal, Sensory/Motor Intact, Alert, Oriented to Person 

Place, Time, CN Intact II-III, Reflexes Intact - brachioradialis and patellar 

reflex 2+, Babinski Bilateral - downward, Finger to Nose - normal, Facial 

Symmetry, Speech Normal.  Negative: Receptive Aphasia, Expressive Aphasia, 

Dysarthric Aphasia


Psychiatric: Positive: Normal


AVPU Assessment: Alert





Diagnostics





- Laboratory


Result Diagrams: 


 17 00:25





 17 00:25


Lab Statement: Any lab studies that have been ordered have been reviewed, and 

results considered in the medical decision making process.





Disposition





- Course


Assessment/Plan: Patient presents for gradual mild headache and 

lightheadedness. EKG NSR with PAC without acute ischemia. Labs significant for 

WBC 11.2 ang glucose 129 but otherwise unremarkable. ESR pending. CT Brain 

pending.





- Diagnoses


Provider Diagnoses: 


 Lightheaded








- Physician Notifications


Discussed Care Of Patient With: signed out to Dr. Demario Zhu





Discharge





- Discharge Plan


Condition: Stable


Disposition: OTHER


Discharge Disposition Comment: signed out to Dr. Demario Alfred0

## 2017-03-24 NOTE — RAD
INDICATION:  Frontal headache.



COMPARISON:  Comparison is made to prior CT brain from March 27, 2015.



TECHNIQUE: Contiguous axial sections of the brain were obtained from the skull base to the

vertex without contrast.



FINDINGS: The ventricles, cisterns and sulci are enlarged consistent with age-related

atrophy.  There are small areas of decreased density in the subcortical and

periventricular white matter suggestive of mild chronic small vessel ischemic changes. No

other focal abnormality or mass effect is seen. There is no evidence for hemorrhage.



No significant focal osseous abnormality is seen. The visualized portion of the paranasal

sinuses and mastoid air cells appear clear.



IMPRESSION:  

1. NO EVIDENCE FOR GROSS ACUTE INFARCT, MASS EFFECT OR HEMORRHAGE.

2. FINDINGS MOST CONSISTENT WITH MILD CHRONIC SMALL VESSEL ISCHEMIC CHANGES.

## 2017-03-25 VITALS — DIASTOLIC BLOOD PRESSURE: 62 MMHG | SYSTOLIC BLOOD PRESSURE: 139 MMHG

## 2017-03-25 LAB
ALBUMIN SERPL BCG-MCNC: 3.1 G/DL (ref 3.2–5.2)
ALP SERPL-CCNC: 69 U/L (ref 34–104)
ALT SERPL W P-5'-P-CCNC: 15 U/L (ref 7–52)
ANION GAP SERPL CALC-SCNC: 7 MMOL/L (ref 2–11)
AST SERPL-CCNC: 23 U/L (ref 13–39)
BUN SERPL-MCNC: 11 MG/DL (ref 6–24)
BUN/CREAT SERPL: 17.5 (ref 8–20)
CALCIUM SERPL-MCNC: 8.4 MG/DL (ref 8.6–10.3)
CHLORIDE SERPL-SCNC: 107 MMOL/L (ref 101–111)
GLOBULIN SER CALC-MCNC: 2.5 G/DL (ref 2–4)
GLUCOSE SERPL-MCNC: 88 MG/DL (ref 70–100)
HCO3 SERPL-SCNC: 22 MMOL/L (ref 22–32)
HCT VFR BLD AUTO: 35 % (ref 35–47)
HGB BLD-MCNC: 11.3 G/DL (ref 12–16)
MCH RBC QN AUTO: 27 PG (ref 27–31)
MCHC RBC AUTO-ENTMCNC: 32 G/DL (ref 31–36)
MCV RBC AUTO: 83 FL (ref 80–97)
POTASSIUM SERPL-SCNC: 5.1 MMOL/L (ref 3.5–5)
PROT SERPL-MCNC: 5.6 G/DL (ref 6.4–8.9)
RBC # BLD AUTO: 4.27 10^6/UL (ref 4–5.4)
SODIUM SERPL-SCNC: 136 MMOL/L (ref 133–145)
T3FREE SERPL-MCNC: (no result) PG/ML (ref 2.5–3.9)
T3FREE SERPL-MCNC: 3.5 PG/ML (ref 2.5–3.9)
TSH SERPL-ACNC: (no result) MCIU/ML (ref 0.34–5.6)
TSH SERPL-ACNC: 0.1 MCIU/ML (ref 0.34–5.6)
WBC # BLD AUTO: 7.4 10^3/UL (ref 3.5–10.8)

## 2017-03-25 RX ADMIN — PREGABALIN SCH MG: 50 CAPSULE ORAL at 07:58

## 2017-03-25 RX ADMIN — DULOXETINE HYDROCHLORIDE SCH MG: 30 CAPSULE, DELAYED RELEASE ORAL at 09:18

## 2017-03-25 RX ADMIN — SODIUM CHLORIDE SCH MLS/HR: 900 IRRIGANT IRRIGATION at 06:43

## 2017-03-25 RX ADMIN — BUPROPION HYDROCHLORIDE SCH: 150 TABLET, FILM COATED, EXTENDED RELEASE ORAL at 07:33

## 2017-03-25 RX ADMIN — DULOXETINE HYDROCHLORIDE SCH: 30 CAPSULE, DELAYED RELEASE ORAL at 07:33

## 2017-03-25 RX ADMIN — BUPROPION HYDROCHLORIDE SCH MG: 150 TABLET, FILM COATED, EXTENDED RELEASE ORAL at 09:19

## 2017-03-25 NOTE — ED
Raymon ROSS Alok, scribed for Lawrence Hanks MD on 03/24/17 at 0406 .





Progress





- Progress Note


Progress Note: 





GIVEN RISK FACTORS, REQUESTED DR. HERNANDEZ CONSIDER CARDIAC WORKUP FOR THIS 

PATIENT, RO MI








- EKG/XRAY/CT


CT: Brain CT - IMPRESSION: NO MASS EFFECT OR INTRACRANIAL HEMORRHAGE.





Course/Dx





- Diagnoses


Provider Diagnoses: 


 Lightheaded, DIZZINESS,CHEST PAIN








- Provider Notifications


Discussed Care Of Patient With: signed out to Dr. Hanks 0230.  Dr Hernandez (

Internal Medicine) @ 0640 - Will come in and see pt. Requesting pt admit.





The documentation as recorded by the angelibRaymon paulino Alok accurately reflects 

the service I personally performed and the decisions made by me, Lawrence Hakns MD.

## 2017-03-25 NOTE — PRO
DATE OF PROCEDURE:  03/24/17 -ROOM #[id number]

 

PROCEDURE:  Gastroscopy.

 

MEDICINE:  Versed 10 mg IV,  Demerol 75 mg IV.

 

NARRATIVE:  This is a 61-year-old woman who has been experiencing back pain and 
upper abdominal pain.  She has had a workup which included a CT scan of the 
abdomen and chest, which demonstrated an enlarged lymph node in the 
paratracheal space, a gallbladder ultrasound which showed a gallbladder polyp 
and a negative cardiac evaluation.

 

She describes the pain is pretty intense.  She felt syncopal at one point.  It 
is not related to meals.  She has had no nausea or vomiting.  For this reason, 
upper endoscopy is being carried out.

 

DESCRIPTION OF PROCEDURE:  After the procedure was discussed with the patient, 
risks and benefits were outlined.  Written consent was obtained.  The patient 
was placed in the left lateral decubitus position and conscious sedation was 
administered.  A video diagnostic gastroscope was inserted orally and passed 
very carefully into the esophagus.  The esophagus, stomach, and duodenum to the 
second to third portion were well visualized.  The patient tolerated the 
procedure well and there were no immediate complications.

 

FINDINGS:  The esophagus was normal.  There was no evidence of an erosive 
change or Williamson's esophagus.  The stomach was entered. There was a relatively 
large hiatal hernia seen but the gastric mucosa was otherwise unremarkable 
without any inflammatory change or ulceration.  The pylorus was normal and 
patent.  The duodenal bulb was normal and the second to third portion of the 
duodenum was normal with a normal folding pattern.

 

CONCLUSION:  Hiatal hernia, otherwise normal gastroscopy. 



RECOMMENDATION:  I believe the primary focus should be on biopsying the lymph 
node as that may elucidate the cause of her symptoms.  Although she does have a 
small gallbladder polyp, her symptoms are atypical for biliary colic.  If, 
however, gallbladder polyp is greater than a centimeter in size, then 
cholecystectomy would be indicated.



CC:  Dr. Chaudhry* 



 [id number]/[facility] #: [id number]

[facility]

## 2017-03-25 NOTE — DS
CC:  Serjio Chaudhry MD

 

DISCHARGE SUMMARY:

 

DATE OF ADMISSION:  03/24/17

 

DATE OF DISCHARGE:  03/25/17

 

DISCHARGE DIAGNOSES:

1.  Chest and abdominal pain, etiology uncertain.

2.  Paratracheal lymphadenopathy, possibly cause of chest and abdominal pain.

3.  Hypothyroidism with replacement, over replaced at this point.

4.  Hyperhidrosis, possibly related to hypothyroidism.

5.  History of anxiety and depression.

6.  Fibromyalgia.

7.  Gastroesophageal reflux disease.

8.  History of vitamin D deficiency.

9.  Obesity.

10.  Osteoarthritis.

11.  Tobacco abuse.

12.  History of SULFA allergy.

13.  Near syncope, possible vasovagal reaction.

14.  Insomnia, related possibly to thyroid.

15.  Low ferritin,possibly related to PPI use.

 

HISTORY:  Deepali Kang is a 61-year-old woman admitted with chest and abdominal 
pain and near syncope.  Please see dictated admission note for details of the 
present illness, past medical history, family history, social and personal 
history, review of systems, and physical examination.

 

LABORATORY DATA:  CBC:  WBC 11.2, H and H 11.5/36, MCV 81, ,000.  Sed 
rate 23.  CBC on 03/25/17:  WBC 7.4, H and H 11.3/35.  Chemistries:  Sodium 138
, potassium 4.0, chloride 104, CO2 26, BUN and creatinine 11/0.86, glucose 129.
  Rest of the comprehensive metabolic panel was within normal limits.  TSH was 
0.18 on 03/24/17 and 0.10 on 03/25/17.  Free T3 and free T4 were within normal 
limits on 03/25/17, after levothyroxine held 0.97/3.50.  B12 was normal at 908, 
ferritin was low at less than 10 (? related to PPI).  CRP was mildly elevated 
14.02 on 03/24/17 and 13.34 on 03/25/17.  Troponins were 0, 6 hours apart.  
Urinalysis yellow clear, specific gravity of 1.021, pH 5, dipsticks positive 
for trace esterase, epithelial cells were present, hyaline casts were present.

 

IMAGING DATA:  Nuclear stress test showed no fixed or reversible perfusion 
defects. Brain CT showed no evidence of gross acute infarct, mass effect or 
hemorrhage, findings most consistent with mild chronic small vessel ischemic 
changes. Gallbladder ultrasound showed a 5-mm polyp, otherwise unremarkable.  
EKG on 03/24/17 showed borderline T-wave abnormalities.  Repeat EKG showed 
borderline elevated AK interval.  No acute changes.  Stress test done on 03/24/
17 showed poor exercise tolerance with abnormal blood pressure response to 
exercise, intermediate risk Negro score solely based on poor exercise time.  
There were no EKG changes. She went 4:04 minutes achieving 7 METS.  Chest 
discomfort did not change with exercise.  Peak heart rate was 87% age 
predicted.  No EKG changes of ischemia. There were frequent PACs with short 
burst of SVT noted.

 

HOSPITAL COURSE:  The patient was admitted to the telemetry unit.  She was 
monitored and no significant arrhythmias were noted.  It was felt that her near 
syncope was likely vasovagal, related to pain and lack of sleep.  She had an EGD
, which was unremarkable.  Cardiac workup was unremarkable.  Gallbladder 
ultrasound did not reveal the cause of her pain.  Her pain actually improved.  
She was feeling better, possibly because she slept in the hospital.   Her usual 
medications were continued. I saw her on 03/25/17.  She had been able to eat.  
She discussed the stress of her job and recent 1-year anniversary of her father'
s death for which she had gone to [place] to be with her sister on her 
birthday.  She got somewhat tearful talking about that.  She rated pain as 2/
10. Her exam showed just mild epigastric tenderness at this point.  It was felt 
that her symptoms might relate to her lymphadenopathy.  She was told that the 
cause of this is unclear, needs a biopsy.  This will need to be done by 
Pulmonary. Pulmonary was not available and so this will be done as an 
outpatient.  I discussed this with Radiology and they did not feel that 
Interventional Radiology could accomplish a biopsy in this location, it should 
be done by Pulmonary.  During her hospitalization, it was also noted that her 
TSH was suppressed.  For this reason, her Synthroid is going to be held until 
she sees Dr. Chaudhry in the next couple of days.  This could have contributed to 
her insomnia.  She was told to be off work for the next week so that she could 
get the procdure needed to make a diagnosis and get her thyroid adjusted. She 
had been given Percocet for pain previously and has at home but did not want to 
take. 

 

DISCHARGE MEDICATIONS:  At the time of discharge her medications are as follows:

 

1.  Wellbutrin  mg 2 a day.

2.  Alprazolam 0.5 mg as needed for anxiety.

3.  Cymbalta 60 mg once a day.

4.  Lyrica 50 mg 3 times a day.

5.  Prilosec.

6.  Omeprazole 40 mg once a day.

7.  Tramadol extended release 100 mg once a day.

 

She is not to take levothyroxine for now.

 

She is to see Dr. Chaudhry in the next 2 to 3 days at which time her biopsy will be 
arranged.

 

 [id number]/[facility] #: [id number]

[facility]

## 2017-04-05 ENCOUNTER — HOSPITAL ENCOUNTER (OUTPATIENT)
Dept: HOSPITAL 25 - OR | Age: 62
Discharge: HOME | End: 2017-04-05
Attending: INTERNAL MEDICINE
Payer: COMMERCIAL

## 2017-04-05 VITALS — SYSTOLIC BLOOD PRESSURE: 127 MMHG | DIASTOLIC BLOOD PRESSURE: 66 MMHG

## 2017-04-05 DIAGNOSIS — C34.91: Primary | ICD-10-CM

## 2017-04-05 DIAGNOSIS — F17.210: ICD-10-CM

## 2017-04-05 PROCEDURE — 88173 CYTOPATH EVAL FNA REPORT: CPT

## 2017-04-05 PROCEDURE — 88342 IMHCHEM/IMCYTCHM 1ST ANTB: CPT

## 2017-04-05 PROCEDURE — 88341 IMHCHEM/IMCYTCHM EA ADD ANTB: CPT

## 2017-04-05 PROCEDURE — 88172 CYTP DX EVAL FNA 1ST EA SITE: CPT

## 2017-04-05 PROCEDURE — 88305 TISSUE EXAM BY PATHOLOGIST: CPT

## 2017-04-06 NOTE — PRO
BRONCHOSCOPY REPORT:

 

DATE OF PROCEDURE:  04/05/17

 

PROCEDURE PERFORMED:  Bronchoscopy with endobronchial ultrasound-guided fine-
needle aspiration from station R4 lymph node and R10 lymph node.

 

PREPROCEDURAL DIAGNOSIS:  Large paratracheal node necrotic-appearing on CT scan.

 

ANESTHESIA:  General anesthesia.

 

ANESTHESIOLOGIST:  Dr. Watson.

 

Refer to anesthesiologist's note for further details.

 

DESCRIPTION OF PROCEDURE:  Informed consent was obtained from the patient prior 
to the procedure after all the risks and benefits were thoroughly explained.  
The patient was recently admitted for abdominal pain, was noted to have 
enlarged paratracheal lymph node and right hilar lymph node on CT scan.  The 
patient is current smoker and she was scheduled for the procedure for 
evaluation of adenopathy.  The patient was intubated with size 8.0 endotracheal 
tube prior to the procedure. Flexible Olympus bronchoscope was inserted through 
the ET tube.  ET tube positioning was confirmed to be 4 cm above the level of 
melida.  Bronchoscope was then advanced into right bronchial tree, which was 
inspected.  No endobronchial lesions were noted.  The patient noted to have 
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  
Minimal thick secretions were noted and were suctioned out.  Bronchoscope was 
then advanced into the left bronchial tree, which was then inspected.  No 
endobronchial lesions were again noted.  The patient noted to have lot of 
secretions, which were suctioned out.  The patient also had bronchomalacia on 
the left side.  Bronchoscope was then withdrawn and EBUS bronchoscope was 
inserted through ET tube.  R4 lymph node was significantly enlarged and was 
accessed with 4 passes.  Rapid on-site evaluation revealed malignant cells.  
Specimen was placed in CytoLyt and RPMI.  Bronchoscope was then advanced into 
R10 area, which was then accessed with 4 passes.  Adequate lymphatic tissue was 
seen and malignant cells were noted.  Specimen was placed in CytoLyt. The 
patient was extubated and seen in recovery area in optimal condition.  The 
patient tolerated the procedure well.

 

 [id number]/[facility] #: [id number]

[facility]

## 2017-04-24 ENCOUNTER — HOSPITAL ENCOUNTER (OUTPATIENT)
Dept: HOSPITAL 25 - OR | Age: 62
Discharge: HOME | End: 2017-04-24
Attending: SURGERY
Payer: COMMERCIAL

## 2017-04-24 VITALS — SYSTOLIC BLOOD PRESSURE: 141 MMHG | DIASTOLIC BLOOD PRESSURE: 86 MMHG

## 2017-04-24 DIAGNOSIS — E89.0: ICD-10-CM

## 2017-04-24 DIAGNOSIS — F32.9: ICD-10-CM

## 2017-04-24 DIAGNOSIS — R59.1: ICD-10-CM

## 2017-04-24 DIAGNOSIS — F17.210: ICD-10-CM

## 2017-04-24 DIAGNOSIS — K21.9: ICD-10-CM

## 2017-04-24 DIAGNOSIS — Z79.891: ICD-10-CM

## 2017-04-24 DIAGNOSIS — C34.91: Primary | ICD-10-CM

## 2017-04-24 PROCEDURE — 71010: CPT

## 2017-04-24 PROCEDURE — C1788 PORT, INDWELLING, IMP: HCPCS

## 2017-04-24 PROCEDURE — 76000 FLUOROSCOPY <1 HR PHYS/QHP: CPT

## 2017-04-24 NOTE — SURGPN
Brief Operative Note





- Surgery


Procedures: 


Procedures





OPERATIVE REPORT





PRE-OP: Lung Cancer





POST-OP: Same





PROCEDURE: powerPort Insertion Left chest





SURGEON: MD Ruben


ANESTHESIA:Local with MAC         Dr. Argeullo


ASST: none


IVF:min


EBL:min


SPECIMEN:none


DRAIN: none


WOUND CLASS:One


COMPLICATIONS: none


TO PACU

## 2017-04-24 NOTE — RAD
INDICATION: PowerPort insertion     



COMPARISON: None



FINDINGS: 67.9 seconds of fluoroscopy were provided for the surgical department.

Fluoroscopic spot imaging of the chest were obtained for operative control and show

placement of a left-sided PowerPort catheter. The catheter demonstrates a normal course

and terminates in the superior vena cava.



CPT II Codes: 6045F (fluoro time doc)

## 2017-04-24 NOTE — RAD
INDICATION: PowerPort insertion     



COMPARISON: Chest x-ray January 18, 2017

 

TECHNIQUE: An AP portable view obtained at 0922 hours is submitted.



FINDINGS: 



Bones/Soft Tissues: There are no acute bony findings. There is a left-sided PowerPort

catheter terminating in the superior vena cava.



Cardiomediastinal: The cardiomediastinal silhouette is normal. 



Lungs: There are no infiltrates. There is no pneumothorax.



Pleura: There are no pleural effusions. 



Other: None



IMPRESSION:  POWERPORT CATHETER IN EXPECTED RADIOGRAPHIC POSITION. LUNGS CLEAR. NO

PNEUMOTHORAX.

## 2017-04-25 NOTE — OP
DATE OF OPERATION:  04/24/17 - [facility]

 

DATE OF BIRTH:  [birth year]

 

SURGEON:  Jorge Miranda MD

 

ANESTHESIOLOGIST:  Mila Arguello MD

 

ANESTHESIA:  Local with monitored anesthesia care.

 

PRE-OP DIAGNOSIS:  Right lung cancer.

 

POST-OP DIAGNOSIS:  Right lung cancer.

 

OPERATIVE PROCEDURE:  Insertion of an 8-Mosotho left chest wall percutaneously 
placed PowerPort.

 

ESTIMATED BLOOD LOSS:  Minimal.

 

WOUND CLASSIFICATION:  One.

 

COMPLICATIONS:  None.

 

DRAINS:  None.

 

SPECIMENS:  None.

 

DESCRIPTION OF PROCEDURE:  Written and informed consent was obtained and the 
left chest was marked with indelible ink and preoperative antibiotics were 
administered.  The patient was taken to the operating room and placed in a 
supine position.  The sequential compression devices were placed in the lower 
extremities.  Anesthesia was administered in the left and right chest and neck 
were prepped and draped in the usual sterile fashion.

 

Time-out verification was completed.

 

The patient was placed in Trendelenburg position and 1% lidocaine was 
infiltrated at the left infra midclavicular area and an 18-gauge Cook needle 
was then used to pass over to the clavicle and the subclavian vein was 
punctured on the first pass with good blood return.  The guidewire was inserted 
without difficulty and confirmed to be in the superior vena cava by fluoroscopy.

 

Next, additional lidocaine was infiltrated below the puncture site.  A 
transverse incision was made several fingerbreadths below this and a 
subcutaneous pocket was made inferior large enough to permit the port placement.

 

Next a catheter was tunneled from the puncture site to the pocket site and 
using the sheath dilator peel-away system, the catheter was inserted into the 
superior vena cava at the junction of the right atrium and confirmed by 
fluoroscopy to be in good position.

 

The catheter was cut to the appropriate length and attached to the port, which 
was placed into the pocket.  It flushed well and withdrew blood without 
difficulty. Then was subsequently flushed with heparin solution.

 

Catheter was secured to the subcutaneous tissue in 2 places with interrupted 3-
0 Prolene suture.  Hemostasis was assured.  The wound was closed in layers of 3-
0 and 4-0 Polysorb suture and a sterile occlusive dressing was applied.

 

The patient tolerated the procedure well, was taken to the recovery room in 
stable condition. A postprocedural chest x-ray showed the catheter to be in 
good position with no evidence of pneumothorax.



CC:  [facility]; KATTY; attention to Dr. Neumann*

 

 [id number]/[facility] #: [id number]

[facility] Center[facility]IL[facility]

## 2017-06-02 ENCOUNTER — HOSPITAL ENCOUNTER (OUTPATIENT)
Dept: HOSPITAL 25 - ED | Age: 62
Setting detail: OBSERVATION
Discharge: HOME | End: 2017-06-02
Attending: INTERNAL MEDICINE | Admitting: HOSPITALIST
Payer: COMMERCIAL

## 2017-06-02 VITALS — SYSTOLIC BLOOD PRESSURE: 135 MMHG | DIASTOLIC BLOOD PRESSURE: 66 MMHG

## 2017-06-02 DIAGNOSIS — R06.02: ICD-10-CM

## 2017-06-02 DIAGNOSIS — R00.0: ICD-10-CM

## 2017-06-02 DIAGNOSIS — K22.4: Primary | ICD-10-CM

## 2017-06-02 DIAGNOSIS — Z88.2: ICD-10-CM

## 2017-06-02 DIAGNOSIS — R07.9: ICD-10-CM

## 2017-06-02 DIAGNOSIS — Z88.8: ICD-10-CM

## 2017-06-02 DIAGNOSIS — C34.90: ICD-10-CM

## 2017-06-02 DIAGNOSIS — R05: ICD-10-CM

## 2017-06-02 DIAGNOSIS — Z79.899: ICD-10-CM

## 2017-06-02 DIAGNOSIS — I49.1: ICD-10-CM

## 2017-06-02 DIAGNOSIS — Z87.891: ICD-10-CM

## 2017-06-02 LAB
MAGNESIUM SERPL-MCNC: 1.7 MG/DL (ref 1.9–2.7)
TROPONIN I SERPL-MCNC: 0.01 NG/ML (ref ?–0.04)

## 2017-06-02 PROCEDURE — 82553 CREATINE MB FRACTION: CPT

## 2017-06-02 PROCEDURE — 99283 EMERGENCY DEPT VISIT LOW MDM: CPT

## 2017-06-02 PROCEDURE — 99217: CPT

## 2017-06-02 PROCEDURE — 93005 ELECTROCARDIOGRAM TRACING: CPT

## 2017-06-02 PROCEDURE — 71010: CPT

## 2017-06-02 PROCEDURE — 83735 ASSAY OF MAGNESIUM: CPT

## 2017-06-02 PROCEDURE — 83880 ASSAY OF NATRIURETIC PEPTIDE: CPT

## 2017-06-02 PROCEDURE — G0378 HOSPITAL OBSERVATION PER HR: HCPCS

## 2017-06-02 PROCEDURE — 84484 ASSAY OF TROPONIN QUANT: CPT

## 2017-06-02 PROCEDURE — 36415 COLL VENOUS BLD VENIPUNCTURE: CPT

## 2017-06-02 PROCEDURE — 96372 THER/PROPH/DIAG INJ SC/IM: CPT

## 2017-06-02 NOTE — HP
HISTORY AND PHYSICAL:

 

DATE OF ADMISSION:  17

 

PRIMARY CARE PHYSICIAN:  Serjio Chaudhry MD

 

CHIEF COMPLAINT:  Chest pain.

 

HISTORY OF PRESENT ILLNESS:  The patient is a 61-year-old woman recently 
diagnosed with small cell carcinoma of the lung, who presents to [facility] with a chief complaint of chest pain that woke her from sleep at 11:40 
p.m.  She states it is in the middle of her chest, went her to back and right 
shoulder.  At its worst, it was 7/10 in severity.  She is not sure if it was 
worse with deep breath and a cough.  She had some shortness of breath with it, 
but no nausea or vomiting.  No sweating.  No clamminess.  She said the pain 
eventually went away on its own.  She said it is similar to the pain she has 
had in the past.  In fact, it is very similar to the pain she had, which 
originally brought her to the hospital when she was diagnosed with the small 
cell cancer.  The patient currently feels well with no complaints.

 

PAST MEDICAL HISTORY:  Significant for small cell carcinoma, recently diagnosed
, status post 2 cycles of chemo and radiation therapy; hypothyroidism, status 
post partial thyroidectomy; fibromyalgia; depression; hyperhidrosis; tobacco 
abuse; history of vitamin D deficiency, resolved; GERD; osteoarthritis; obesity
; and anxiety.

 

PAST SURGICAL HISTORY:  Recent port placement, hemithyroidectomy, foot surgery 
for bunions and hammertoes, surgery on the right _____ trapezium and proximal 
third of trapezoid with suture suspension arthroplasty, left knee arthroscopy, 
T and A at age 19.

 

MEDICATIONS:  Current medications are as follows:

1.  Magnesium 200 mg 4 times a day.

2.  Vitamin D 4000 units daily.

3.  Wellbutrin  mg daily.

4.  Xanax 0.5 mg 3 times a day as needed.

5.  Percocet 5/325 every 6 hours as needed.

6.  Tramadol 100 mg daily.

7.  Pregabalin 50 mg 3 times a day.

8.  Omeprazole 40 mg twice a day.

9.  Multivitamin 1 tablet daily.

10.  Levothyroxine 200 mcg daily.

11.  Duloxetine 60 mg daily.

 

ALLERGIES:  She has an allergy/adverse reaction to SULFA, which causes hives.

 

FAMILY HISTORY:  Father  at 83, dementia, rheumatoid arthritis, and 
depression. Mother with kidney failure, type 2 diabetes requiring dialysis, and 
hypertension. Three siblings, one with lupus.  She has 2 children, alive and 
well.

 

SOCIAL HISTORY:  Lives alone.  Two adult children.  Works in the police 
department.

 

REVIEW OF SYSTEMS:  A 14-point review of systems was completed with the 
patient. All pertinent positives and negatives are in the history of present 
illness, otherwise it is negative.

 

                               PHYSICAL EXAMINATION

 

GENERAL:  A very pleasant woman, lying in bed, in no acute distress.

 

VITAL SIGNS:  Temperature 97.8 degrees, heart rate 97 beats per minute, 
respiratory rate 14 breaths per minute, pulse ox 98% on room air, blood 
pressure 121/61.

 

HEENT:  Normocephalic, atraumatic.  Pupils equal, round, and reactive to light. 
Moist mucous membranes.

 

NECK:  Supple.  No JVD, bruits, palpable thyroid, or lymphadenopathy.

 

CHEST:  Clear to auscultation and percussion bilaterally.

 

CARDIOVASCULAR:  S1, S2 appreciated.

 

ABDOMEN:  Positive bowel sounds in all 4 quadrants.  Soft, nontender, 
nondistended. No hepatosplenomegaly.

 

EXTREMITIES:  No cyanosis, clubbing, +2 peripheral pulses bilaterally.

 

NEUROLOGIC:  Alert and oriented x3.  Moves all extremities.

 

SKIN:  No rashes.

 

 LABORATORY DATA/DIAGNOSTIC STUDIES:  Magnesium 1.7.  Troponin 0.01.  BNP 35. 
Sodium 135, potassium 4.5, chloride 99, CO2 28, BUN 15, creatinine 1.42, 
glucose 123.  White count 1.2, hemoglobin 9.5, hematocrit 29, and platelets 92.

 

EKG shows normal sinus rhythm at 99 beats per minute, normal axis, no acute ST-
T wave changes.  



Chest x-ray shows no acute infiltrates.

 

ASSESSMENT AND PLAN:

1.  Chest pain.  Because it is so similar to her prior episodes of chest pain 
and is fairly atypical, I do not think this is cardiac in nature.  We will 
cycle her troponins and rule her out for an MI.  I do not think she needs even 
a stress test, but I think certainly a transthoracic echo would be appropriate, 
which I will order for the next morning.  If this is unremarkable, I think she 
should go home as early as later in the day.  I will obviously place her on 
telemetry as well.  I also think that PE is unlikely in this patient at this 
time as her pain resolved on its own and she is not hypoxic and quite stable at 
this time.

2.  Small cell lung cancer.  Management as per Oncology.

3.  FEN, regular diet.

4.  DVT prophylaxis, heparin subcu.

5.  The patient is a full code.

 

TIME SPENT:  Over 75 minutes were spent on this H and P, more than 40 minutes 
were spent in direct face-to-face contact with the patient in evaluation, 
physical exam, counseling, and coordination of care.

 

 CC:  Serjio Chaudhry MD; Alice Neumann MD *

 

[id number]/[facility] #: [id number]

MTDD

## 2017-06-02 NOTE — DS
CC:  Dr. Chaudhry

 

DISCHARGE SUMMARY:

 

DATE OF ADMISSION:  06/01/17

 

DATE OF DISCHARGE:  06/02/17

 

DISCHARGE DIAGNOSIS:  Esophageal spasm.

 

CONCURRENT DIAGNOSIS:  Small cell lung cancer, currently receiving chemotherapy and radiation.

 

HOSPITAL COURSE:  Please see Dr. Lara's history and physical for presentation. [facility] one hour of 7/10 chest pain.  It woke her up in the middle of the night.  She did not take
 any medication and it self resolved.  No associated palpitations, shortness of breath, diaphoresis,
 or dizziness.  She has had similar pain in the past.  She had a cardiac evaluation in March with lo
w exercise tolerance with low risk cardiac imaging.  Of note, she had a pepperoni pizza before bed o
n that night.  She has had some sore throat from her radiation. She has been on omeprazole but does 
only taking Carafate because of the medication consistency.  Otherwise has been tolerating chemother
apy and radiation reasonably well.

 

She has 3 troponins that were negative and given recent stress test, she does not need to be restres
sed today.

 

She has radiation scheduled later this morning.  I will plan on discharging her home after radiation
.

 

DISCHARGE MEDICATIONS:

1.  Alprazolam 0.5 mg t.i.d. p.r.n.

2.  Wellbutrin  mg q.12 hours.

3.  Carafate 10 mL suspension t.i.d. and at bedtime p.r.n.

4.  Vitamin D 1000 a day.

5.  Compazine 10 mg q.6 p.r.n.

6.  Cymbalta 60 mg daily.

7.  Levoxyl 200 mcg daily.

8.  Lyrica 50 mg t.i.d.

9.  Magnesium 400 mg 4 tablets twice a day.

10.  Multivitamin.

11.  Prilosec 20 mg b.i.d.

12.  Potassium chloride 40 mEq daily.

13.  Amoxicillin 500 mg t.i.d. for tooth abscess.

 

Plan will be to discharge home.  She should call me over the weekend with any additional complaints.
  I did instruct her to take Carafate before bed and not eat 2 hours before lying down.  We will con
tinue with radiation.

 

FOLLOWUP PLAN:  To follow up next week in clinic with Dr. Neumann.

 

Second issue has been recent dental abscess.  She is going to be on amoxicillin, but we will plan to
oth extraction once her blood counts rise but before her next cycle of chemotherapy.

 

 [id number]/[facility] #: [id number]

## 2017-06-02 NOTE — RAD
INDICATION:  Chest pain.



COMPARISON:  Comparison is made with a prior chest x-ray study from May 15, 2017.



TECHNIQUE: A portable view of the chest was obtained.



FINDINGS: There is a power port central venous catheter present entering on the left side.

The catheter tip projects over the right atrium. The heart is within normal limits in

size.



The lungs appear hyperinflated and clear. No pleural effusion is seen.



IMPRESSION:  NO EVIDENCE FOR ACUTE FINDING.

## 2017-06-02 NOTE — ED
HPI Chest Pain





- HPI Summary


HPI Summary: 





Patient BIBA is a 62yo F recently diagnosed with lung CA presenting with chest 

pain since 1 hour ago.  The pain awoke her from sleep, had been constant for 30 

minutes, began in the midsternal chest and radiated through to the back and 

right shoulder.  Pain was constant and crushing.  She now notes to only right 

shoulder pain, but denies chest pain. She denies radiation to the jaw but is 

having jaw pain d/t a tooth which needs to be pulled.  She is currently on 

Amoxicillin for this issue.  Last WBC was 1.2 and she has been pancytopenic 

since dx.  Denies personal or family history of CAD.  She denies diaphoresis, 

weakness or SOB. She received 325mg aspirin in ambulance en route.  She notes 

to extreme recent stress today during her oncology visit and is tearful on 

examination.





- History of Current Complaint


Chief Complaint: EDChestPainROMI


Time Seen by Provider: 06/02/17 00:38


Hx Obtained From: Patient


Onset/Duration: Started Minutes Ago


Time of Onset: 00:05


Timing: Constant, Lasting Minutes


Initial Severity: Moderate


Current Severity: Moderate


Pain Intensity: 5


Pain Scale Used: 0-10 Numeric


Chest Pain Location: Discrete at:, Mid Sternal


Chest Pain Radiates: Yes


Chest Pain Radiates To:: Back, Shoulder - right


Character: Crushing


Aggravating Factor(s): Nothing


Alleviating Factor(s): Nothing


Associated Signs and Symptoms: Positive: Anxiety, Recent Stress





- Risk Factors


Pulmonary Embolism Risk Factors: Malignancy


TAD Risk Factors: Smoking





- Allergy/Home Medications


Allergies/Adverse Reactions: 


 Allergies











Allergy/AdvReac Type Severity Reaction Status Date / Time


 


Sulfa Drugs Allergy Intermediate Hives Verified 06/02/17 00:45














PMH/Surg Hx/FS Hx/Imm Hx


Previously Healthy: Yes


Endocrine/Hematology History: Reports: Hx Thyroid Disease - HYPOTHYROID


   Denies: Hx Diabetes, Hx Anemia


Cardiovascular History: Reports: Hx Valvular Heart Disease - MV prolapse


   Denies: Hx Congestive Heart Failure, Hx Hypertension, Hx Pacemaker/ICD


GI History: Reports: Hx Gastroesophageal Reflux Disease, Hx Hiatal Hernia, 

Other GI Disorders - GERD, ? IBS


   Denies: Hx Jaundice


 History: 


   Denies: Hx Dialysis, Hx Renal Disease


Musculoskeletal History: Reports: Hx Arthritis - osteoarthritis in lower back, 

Hx Orthopedic Injury


Sensory History: Reports: Hx Contacts or Glasses


   Denies: Hx Hearing Aid


Opthamlomology History: Reports: Hx Contacts or Glasses


Neurological History: Reports: Hx Nerve Disease - fibromyalgia, Other Neuro 

Impairments/Disorders - L5-S1 PAIN INJECTION


Psychiatric History: Reports: Hx Anxiety, Hx Depression


   Denies: Hx Panic Disorder





- Cancer History


Cancer Type, Location and Year: LUNG CA - NEWLY DX


Hx Chemotherapy: No


Hx Radiation Therapy: No





- Surgical History


Surgery Procedure, Year, and Place: SEVERAL FOOT SURGERIES-([facility]) [facility].  MENISCUS 

REPAIR- Lt KNEE -.  Partial thyroidectomy (cyst).  Rt HAND- REMOVED A PIECE OF 

BONE


Hx Anesthesia Reactions: No





- Immunization History


Date of Tetanus Vaccine: unk


Date of Influenza Vaccine: unk


Hx Pertussis Vaccination: No


Immunizations Up to Date: Unable to Obtain/Confirm


Infectious Disease History: No


Infectious Disease History: 


   Denies: Traveled Outside the [place] in Last 30 Days





- Family History


Known Family History: Positive: Renal Disease, Other - arthritis, CA





- Social History


Occupation: Unemployed


Lives: With Family


Alcohol Use: Occasionally


Alcohol Amount: 2-3 beers a couple times per week- pt seems evasive


Hx Substance Use: No


Substance Use Type: Reports: None


Substance Use Comment - Amount & Last Used: Tramadol ER


Hx Tobacco Use: Yes


Smoking Status (MU): Former Smoker


Type: Cigarettes


Amount Used/How Often: 1 ppd


Have You Smoked in the Last Year: Yes





Review of Systems


Constitutional: Negative


Eyes: Negative


Positive: Chest Pain


Respiratory: Negative


Gastrointestinal: Negative


Positive: no symptoms reported, see HPI


Musculoskeletal: Negative


Skin: Negative


Neurological: Negative


Positive: Anxious


All Other Systems Reviewed And Are Negative: Yes





Physical Exam


Triage Information Reviewed: Yes


Vital Signs On Initial Exam: 


 Initial Vitals











Temp Pulse Resp BP Pulse Ox


 


 97.8 F   100   14   130/41   95 


 


 06/02/17 00:32  06/02/17 00:32  06/02/17 00:32  06/02/17 00:32  06/02/17 00:32











Vital Signs Reviewed: Yes


Appearance: Positive: Ill-Appearing, Cachectic


Skin: Positive: Warm, Dry, Pale


Head/Face: Positive: Normal Head/Face Inspection


Eyes: Positive: EOMI, ROSAMARIA, Conjunctiva Clear


Neck: Positive: Supple, Nontender, No Lymphadenopathy


Respiratory/Lung Sounds: Positive: Clear to Auscultation, Breath Sounds Present


Cardiovascular: Positive: RRR, Pulses are Symmetrical in both Upper and Lower 

Extremities, Tachycardia


Abdomen Description: Positive: Nontender


Musculoskeletal: Positive: Normal, Strength/ROM Intact


Neurological: Positive: Sensory/Motor Intact, Alert, Oriented to Person Place, 

Time, Speech Normal


Psychiatric: Positive: Normal, Anxious, Depressed


AVPU Assessment: Alert





- [place] Coma Scale


Best Eye Response: 4 - Spontaneous


Best Motor Response: 6 - Obeys Commands


Best Verbal Response: 5 - Oriented


Coma Scale Total: 15





Diagnostics





- Vital Signs


 Vital Signs











  Temp Pulse Resp BP Pulse Ox


 


 06/02/17 00:32  97.8 F  100  14  130/41  95














- Laboratory


Lab Statement: Any lab studies that have been ordered have been reviewed, and 

results considered in the medical decision making process.





Chest Pain Course/Dx





- Course


Course Of Treatment: Patient presents to ED with chest pain. Recent dx of lung 

CA with appt at oncology today.  Pain is crushing, radiates directly through to 

the back and to the right shoulder.  Spontaneously resolved in 30 minutes, now 

only c/o right shoulder pain. No family or personal history of CAD.  WBC 1.2 

today.  EKG shows sinus tachycardia with atrial premature complexes and 

prolonged KS interval.  Patient given aspirin en route by EMS.  Trop is 0.01.  

Will await other trops and admit.  Called Dr. Lara at 1:50pm who accepted 

patient.





- Chest Pain


Differential Diagnosis/HQI/PQRI: Acute MI, ACS, Angina, Chest Wall





- Diagnoses


Provider Diagnoses: 


 Chest pain








Discharge





- Discharge Plan


Condition: Stable


Disposition: ADMITTED TO [facility]

## 2017-06-02 NOTE — PN
Subjective





- Subjective


Reason for Note: Discharge Note


History: 





DISCHARGE SUMMARY





I obtained her presentation from the patient and also from Dr. Aryan Lara's 

history and physical.  She was woken from sleep by 7/10 chest pain that was 

lower sternal, radiated to her back, and right shoulder.  There was no 

associated palpitations, dyspnea, nausea or vomiting. She had no diaphoresis 

and her pain was not exacerbated by moving her chest wall, breathing or 

coughing.  She ate a pepperoni pizza last night and has GERD.  She has no pain 

this morning.  She states the pain was similar to that which led to the 

diagnosis of her lung cancer.  She is currently receiving chemotherapy and is 

under the care of the oncology team.





This morning she has no chest pain - it lasted 1 hour.  She is feeling her 

usual self, no new symptoms


Active Problems: 


 Active Problems





Atypical chest pain (Acute) R07.89


Small cell carcinoma of right lung (Acute) C34.91


Anxiety disorder (Chronic 14) F41.9


Depression (Chronic 14) F32.9


Fibromyalgia (Chronic 14) M79.7


Gastroesophageal reflux disease (Chronic 14) K21.9


Obesity (Chronic 14) E66.9


Tobacco abuse (Chronic 14) Z72.0








Current Medications: 


 Current Medications





Alprazolam (Xanax Tab*)  0.5 mg PO TID PRN


   PRN Reason: ANXIETY


   Last Admin: 17 05:11 Dose:  0.5 mg


Bupropion HCl (Wellbutrin Sr Tab*)  150 mg PO DAILY [facility]


   Last Admin: 17 07:23 Dose:  150 mg


Duloxetine HCl (Cymbalta Cap*)  60 mg PO DAILY [facility]


   Last Admin: 17 07:49 Dose:  60 mg


Heparin Sodium (Porcine) (Heparin Vial(*))  5,000 units SUBCUT Q8HR [facility]


   Last Admin: 17 05:11 Dose:  5,000 units


Levothyroxine Sodium (Synthroid Tab*)  200 mcg PO 0600 [facility]


   Last Admin: 17 05:11 Dose:  200 mcg


Magnesium Oxide (Magox 400 Tab*)  200 mg PO QID [facility]


   Last Admin: 17 07:23 Dose:  200 mg


Multivitamins/Minerals (Theragran/Minerals Tab*)  1 tab PO DAILY [facility]


   Last Admin: 17 07:22 Dose:  1 tab


Omeprazole (Prilosec Cap*)  40 mg PO BID [facility]


   Last Admin: 17 07:22 Dose:  40 mg


Oxycodone/Acetaminophen (Percocet 5/325 Tab*)  1 tab PO Q6H PRN


   PRN Reason: PAIN


Pregabalin (Lyrica Cap(*))  50 mg PO TID [facility]


   Last Admin: 17 07:23 Dose:  50 mg








Home Medications: 


 Home Medications











 Medication  Instructions  Recorded  Confirmed  Type


 


Bupropion HCl [Wellbutrin Sr] 150 mg PO DAILY 14 History


 


DULoxetine CAP* [Cymbalta CAP*] 60 mg PO DAILY 14 History


 


Levothyroxine TAB* [Synthroid 100 200 mcg PO DAILY 14 History





MCG TAB*]    


 


Omeprazole CAP* [Prilosec CAP*] 40 mg PO BID 14 History


 


Cholecalciferol [Vitamin D] 4,000 unit PO DAILY 02/16/15 06/02/17 History


 


Pregabalin CAP(*) [Lyrica CAP(*)] 50 mg PO TID 16 History


 


Tramadol ER(NF) [Ultram HCl ER(NF)] 100 mg PO DAILY 16 History


 


Alprazolam [Xanax] 0.5 mg PO TID PRN 17 History


 


Multiple Vitamins W/ Minerals 1 tab PO DAILY 17 History





[Multivitamin Women 50+]    


 


oxyCODONE/Acetamin 5/325 MG* 1 tab PO Q6H PRN #12 tab MDD 4 17 Rx





[Percocet 5/325 TAB*]    


 


Magnesium [Magnacaps] 200 mg PO QID 17 History











Allergies: 


 Allergies











Allergy/AdvReac Type Severity Reaction Status Date / Time


 


Sulfa Drugs Allergy Intermediate Hives Verified 17 02:28














Objective





- Vital Signs


Vital Signs: 


 Vital Signs











  17





  03:14 04:44 05:11


 


Temperature  97.7 F 


 


Pulse Rate 97 93 


 


Respiratory 14 18 18





Rate   


 


Blood Pressure 121/61 136/74 





(mmHg)   


 


O2 Sat by Pulse 98 97 





Oximetry   














  17





  06:41 07:11 07:23


 


Temperature   


 


Pulse Rate   


 


Respiratory 18 18 18





Rate   


 


Blood Pressure   





(mmHg)   


 


O2 Sat by Pulse   





Oximetry   














  17





  08:04


 


Temperature 98.3 F


 


Pulse Rate 93


 


Respiratory 22





Rate 


 


Blood Pressure 135/66





(mmHg) 


 


O2 Sat by Pulse 96





Oximetry 














- Intake and Output


Intake and Output: 


 Intake & Output











 17





 11:59 11:59 11:59 11:59


 


Intake Total    0


 


Balance    0


 


Weight    231 lb 3.2 oz


 


Intake:    


 


  Oral    0


 


Other:    


 


  # Bowel Movements    0


 


  # Voids    0





 





ADLs: Meal  Record                                         Start:  17 04:

44


Freq:   DAILY@0900,1400,1800                               Status: Active      

  


 Created      17 04:44  System  (Rec: 17 04:44  System  [id number])


Intake and Output                                          Start:  17 00:

32


Freq:                                                      Status: Active      

  


 Created      17 00:32  System  (Rec: 17 00:32  System  [id number])


Intake and Output                                          Start:  17 04:

44


Freq:   DAILY@0600,1400,2200                               Status: Active      

  


 Created      17 04:44  System  (Rec: 17 04:44  System  [id number])


 Document     17 05:45  [id number]  (Rec: 17 05:46  [id number]  [id number])











- Physical Exam


General: No Cyanosis, No Anemia, No Jaundice, No Clubbing


Lungs and Chest: Yes: Chest Expansion Full, Chest Expansion Symetrica, 

Percussion Note Resonant, Vessicular Breath Sounds.  No: Crackles, Wheezes


Heart Rate and Rhythm: Regular


JVP: Not Elevated


Additional Cardiovascular: Yes: Normal Heart Sounds.  No: Heart Murmur, Pedal 

Edema


Abdominal Exam: Yes: Soft, Bowel Sounds Present.  No: Distention, Abdominal 

Tenderness





Results





- Results


Lab Results: 


 Laboratory Results - last 24 hr











  17





  04:00 06:50


 


Troponin I  0.00  0.00











Radiology Results: 





Patient Name:                    VICTORIA BUI                                

                                                                            

Medical Record#: [id number]


Ordering Physician: Laura MAKI                                          

                                                                            

Acct.#: [id number]


:         1955                    Age: 61   Sex: F                    

                                                                            

Location: [address] [place] [address]/TELEMETRY


Exam Date: 17                                                       

                                                                            ADM 

Status: ADM Kim


Order Information:                                               CHEST AP 

PORTABLE


Accession Number:                                                [id number]


CPT:                                                             74797








INDICATION:  Chest pain.





COMPARISON:  Comparison is made with a prior chest x-ray study from May 15, 

2017.





TECHNIQUE: A portable view of the chest was obtained.





FINDINGS: There is a power port central venous catheter present entering on the 

left side.


The catheter tip projects over the right atrium. The heart is within normal 

limits in


size.





The lungs appear hyperinflated and clear. No pleural effusion is seen.





IMPRESSION:  NO EVIDENCE FOR ACUTE FINDING.





____________________________________________________________


<Electronically signed by Clint Cox MD in OV>  17


Dictated By: Clint Cox MD


Dictated Date/Time: 17


Transcribed Date/Time: 17 0744


Copy to:











CC:Serjio Chaudhry MD; Aryan Lara MD; Laura MAKI





Imaging - [facility]                                                          

  Imaging - [facility]                                                 

                     Imaging - [facility] 


[address]                                                                

  [address] [address]


ph ([phone])                                                              

  ph ([phone])                                                            

                     ph ([phone]) 









































                                                                               

                        1 of 





EKG Report: 





Sinus rhythm 99  QTc 451  QRS axis 66 1st degree AV block. PACs. No ST-T 

changes





Assessment





- Problem List


Assessment: 


Patient Problems





Atypical chest pain (Acute)


Small cell carcinoma of right lung (Acute)


Anxiety disorder (Chronic 14)


Depression (Chronic 14)


Fibromyalgia (Chronic 14)


Gastroesophageal reflux disease (Chronic 14)


Obesity (Chronic 14)


Tobacco abuse (Chronic 14)


Osteoarthritis (Chronic 14)


Primary hypothyroidism (Chronic 14)








Plan: 





Atypical chest pain (Acute)  She has acute chest pain waking her from sleep.  

She has had this before.  It is not associated with EKG or troponin I changes.  

She had a stress test 3/24/2017 - poor exercise tolerance.  This was low risk 

on NM scan.  I don't think this is ischemic heart disease.  Most likely this is 

GERD with esophageal spasm as she ate a pepperoni pizza the night before.  I 

will have Dr. Rodriguez see her to rule out an oncological aspect.  If there is no 

evidence of an oncological problem, I will discharge her home


Small cell carcinoma of right lung (Acute)  Per Dr. Rodriguez


Anxiety disorder (Chronic 14)  secondary diagnosis


Depression (Chronic 14) secondary diagnosis


Fibromyalgia (Chronic 14) secondary diagnosis


Gastroesophageal reflux disease (Chronic 14)  Likely cause of her chest 

pain


Obesity (Chronic 14) secondary diagnosis


Tobacco abuse (Chronic 14) counseled to be off tobacco


Osteoarthritis (Chronic 14) secondary diagnosis


Primary hypothyroidism (Chronic 14) secondary diagnosis





I discussed the above with the patient and emphasized that she should stay away 

from fatty foods like pepperoni pizza.

## 2017-09-26 ENCOUNTER — HOSPITAL ENCOUNTER (EMERGENCY)
Dept: HOSPITAL 25 - ED | Age: 62
LOS: 1 days | Discharge: HOME | End: 2017-09-27
Payer: COMMERCIAL

## 2017-09-26 DIAGNOSIS — R00.2: Primary | ICD-10-CM

## 2017-09-26 DIAGNOSIS — R19.7: ICD-10-CM

## 2017-09-26 DIAGNOSIS — Z87.891: ICD-10-CM

## 2017-09-26 DIAGNOSIS — R06.02: ICD-10-CM

## 2017-09-26 DIAGNOSIS — E05.90: ICD-10-CM

## 2017-09-26 LAB
ALBUMIN SERPL BCG-MCNC: 3.9 G/DL (ref 3.2–5.2)
ALP SERPL-CCNC: 75 U/L (ref 34–104)
ALT SERPL W P-5'-P-CCNC: 13 U/L (ref 7–52)
ANION GAP SERPL CALC-SCNC: 7 MMOL/L (ref 2–11)
AST SERPL-CCNC: 14 U/L (ref 13–39)
BUN SERPL-MCNC: 15 MG/DL (ref 6–24)
BUN/CREAT SERPL: 17 (ref 8–20)
CALCIUM SERPL-MCNC: 9.1 MG/DL (ref 8.6–10.3)
CHLORIDE SERPL-SCNC: 101 MMOL/L (ref 101–111)
CK SERPL-CCNC: 39 U/L (ref 10–223)
GLOBULIN SER CALC-MCNC: 3.2 G/DL (ref 2–4)
GLUCOSE SERPL-MCNC: 135 MG/DL (ref 70–100)
HCO3 SERPL-SCNC: 29 MMOL/L (ref 22–32)
HCT VFR BLD AUTO: 33 % (ref 35–47)
HGB BLD-MCNC: 11.3 G/DL (ref 12–16)
LIPASE SERPL-CCNC: 10 U/L (ref 11–82)
MAGNESIUM SERPL-MCNC: 1.9 MG/DL (ref 1.9–2.7)
MCH RBC QN AUTO: 32 PG (ref 27–31)
MCHC RBC AUTO-ENTMCNC: 34 G/DL (ref 31–36)
MCV RBC AUTO: 94 FL (ref 80–97)
POTASSIUM SERPL-SCNC: 3.6 MMOL/L (ref 3.5–5)
PROT SERPL-MCNC: 7.1 G/DL (ref 6.4–8.9)
RBC # BLD AUTO: 3.55 10^6/UL (ref 4–5.4)
SODIUM SERPL-SCNC: 137 MMOL/L (ref 133–145)
TROPONIN I SERPL-MCNC: 0.01 NG/ML (ref ?–0.04)
WBC # BLD AUTO: 6.1 10^3/UL (ref 3.5–10.8)

## 2017-09-26 PROCEDURE — 82550 ASSAY OF CK (CPK): CPT

## 2017-09-26 PROCEDURE — 83880 ASSAY OF NATRIURETIC PEPTIDE: CPT

## 2017-09-26 PROCEDURE — 71010: CPT

## 2017-09-26 PROCEDURE — 84443 ASSAY THYROID STIM HORMONE: CPT

## 2017-09-26 PROCEDURE — 83605 ASSAY OF LACTIC ACID: CPT

## 2017-09-26 PROCEDURE — 82553 CREATINE MB FRACTION: CPT

## 2017-09-26 PROCEDURE — 85730 THROMBOPLASTIN TIME PARTIAL: CPT

## 2017-09-26 PROCEDURE — 86140 C-REACTIVE PROTEIN: CPT

## 2017-09-26 PROCEDURE — 85379 FIBRIN DEGRADATION QUANT: CPT

## 2017-09-26 PROCEDURE — 99284 EMERGENCY DEPT VISIT MOD MDM: CPT

## 2017-09-26 PROCEDURE — 84484 ASSAY OF TROPONIN QUANT: CPT

## 2017-09-26 PROCEDURE — 83735 ASSAY OF MAGNESIUM: CPT

## 2017-09-26 PROCEDURE — 80053 COMPREHEN METABOLIC PANEL: CPT

## 2017-09-26 PROCEDURE — 81003 URINALYSIS AUTO W/O SCOPE: CPT

## 2017-09-26 PROCEDURE — 36415 COLL VENOUS BLD VENIPUNCTURE: CPT

## 2017-09-26 PROCEDURE — 93005 ELECTROCARDIOGRAM TRACING: CPT

## 2017-09-26 PROCEDURE — 85025 COMPLETE CBC W/AUTO DIFF WBC: CPT

## 2017-09-26 PROCEDURE — 83690 ASSAY OF LIPASE: CPT

## 2017-09-26 PROCEDURE — 85610 PROTHROMBIN TIME: CPT

## 2017-09-27 VITALS — SYSTOLIC BLOOD PRESSURE: 131 MMHG | DIASTOLIC BLOOD PRESSURE: 68 MMHG

## 2017-09-27 LAB — TSH SERPL-ACNC: 0.07 MCIU/ML (ref 0.34–5.6)

## 2017-09-27 NOTE — RAD
HISTORY: Palpitation



COMPARISONS: June 02, 2017



VIEWS: 1: frontal portable view of the chest at 11:30 PM



FINDINGS:

LINES AND TUBES: A left-sided chest port is noted with the tip overlying the cavoatrial

junction.

CARDIOMEDIASTINAL SILHOUETTE: The cardiomediastinal silhouette is normal for portable

technique.

PLEURA: The costophrenic angles are sharp. No pleural abnormalities are noted.

LUNG PARENCHYMA: The lungs are clear.

ABDOMEN: The upper abdomen is clear. There is no subphrenic gas.

BONES AND SOFT TISSUES: No bone or soft tissue abnormalities are noted.



IMPRESSION: NO ACTIVE CARDIOPULMONARY DISEASE.

## 2017-09-27 NOTE — ED
Brent ROSS Alfonso, scribed for Jerry Ryan MD on 09/27/17 at 0004 .





Palpitations / Dysrhythmia





- HPI Summary


HPI Summary: 


 This patient is a 61 year old F presenting to [facility] with a chief complaint of 

palpitations which began at rest 4 hours ago. She states I can see it beating 

in my chest it was like anxiety or something. The patient rates the pain 6/10 

in severity. Symptoms aggravated by nothing. Symptoms alleviated by nothing. 

Patient reports feeling bloated, tired, SOB (resolved), and diarrhea (two days)

. Patient denies CP, N/V, blood in the stools, diaphoresis, near syncope, 

lightheadedness, calf swelling, and calf pain. Two days ago she began retaking 

a prescription for Lyrica. PMHx includes lung cancer.





- History of Current Complaint


Chief Complaint: EDChestWallPain


Time Seen by Provider: 09/26/17 23:50


Hx Obtained From: Patient


Onset/Duration: Sudden Onset, Lasting Hours - 4


Timing: Constant


Severity Currently: Moderate - 6/10


Aggravating: Nothing


Alleviating: Nothing


Associated Signs & Symptoms: Shortness of Breath





- Allergy/Home Medications


Allergies/Adverse Reactions: 


 Allergies











Allergy/AdvReac Type Severity Reaction Status Date / Time


 


Sulfa Drugs Allergy Intermediate Hives Verified 09/26/17 21:41


 


CONTRAST Allergy  See Comment Uncoded 09/26/17 21:41














PMH/Surg Hx/FS Hx/Imm Hx


Endocrine/Hematology History: Reports: Hx Thyroid Disease - HYPOTHYROID


   Denies: Hx Diabetes, Hx Anemia


Cardiovascular History: Reports: Hx Valvular Heart Disease - MV prolapse, Other 

Cardiovascular Problems/Disorders - VALVULAR HEART DISEASE(MV PROLAPSE)


   Denies: Hx Congestive Heart Failure, Hx Hypertension, Hx Pacemaker/ICD


Respiratory History: Reports: Other Respiratory Problems/Disorders - LUNG CA, 

PT.STATES?LT.LUNG, JUST DX'D EARLIER THIS YEAR


GI History: Reports: Hx Gastroesophageal Reflux Disease, Hx Hiatal Hernia, 

Other GI Disorders - GERD, ? IBS


   Denies: Hx Jaundice


 History: 


   Denies: Hx Dialysis, Hx Renal Disease


Musculoskeletal History: Reports: Hx Arthritis - osteoarthritis in lower back, 

Hx Orthopedic Injury


Sensory History: Reports: Hx Contacts or Glasses


   Denies: Hx Hearing Aid


Opthamlomology History: Reports: Hx Contacts or Glasses


Neurological History: Reports: Hx Nerve Disease - fibromyalgia, Other Neuro 

Impairments/Disorders - L5-S1 PAIN INJECTION


Psychiatric History: Reports: Hx Anxiety, Hx Depression


   Denies: Hx Panic Disorder





- Cancer History


Cancer Type, Location and Year: LUNG CA - NEWLY DX


Hx Chemotherapy: No


Hx Radiation Therapy: No





- Surgical History


Surgery Procedure, Year, and Place: SEVERAL FOOT SURGERIES-([facility]) [facility].  MENISCUS 

REPAIR- Lt KNEE -.  Partial thyroidectomy (cyst).  Rt HAND- REMOVED A PIECE OF 

BONE


Hx Anesthesia Reactions: No





- Immunization History


Date of Tetanus Vaccine: unk


Date of Influenza Vaccine: unk


Infectious Disease History: No


Infectious Disease History: 


   Denies: Traveled Outside the [place] in Last 30 Days





- Family History


Known Family History: Positive: Renal Disease, Other - arthritis, CA





- Social History


Alcohol Use: Occasionally


Alcohol Amount: 2-3 beers a couple times per week- pt seems evasive


Hx Substance Use: No


Substance Use Type: Reports: None


Substance Use Comment - Amount & Last Used: Tramadol ER


Hx Tobacco Use: Yes


Smoking Status (MU): Former Smoker


Type: Cigarettes


Amount Used/How Often: 1 ppd


Have You Smoked in the Last Year: Yes





Review of Systems


Negative: Skin Diaphoresis


Positive: Palpitations.  Negative: Chest Pain


Positive: Shortness Of Breath


Positive: Diarrhea, Other - feeling bloated; negative blood in the stools.  

Negative: Vomiting, Nausea


Positive: Other - Negative calf swelling, and calf pain


Neurological: Other - tired; negative near syncope, lightheadedness


All Other Systems Reviewed And Are Negative: Yes





Physical Exam





- Summary


Physical Exam Summary: 





General: well-appearing, no pain distress


Skin: warm, color reflects adequate perfusion, dry


Head: normal


Eyes: EOMI, ROSAMARIA


ENT: normal


Neck: supple, nontender


Respiratory: CTA, breath sounds present


Cardiovascular: Mild tachycardia. Regular rhythm.


Abdomen: soft, nontender


Bowel: present


Musculoskeletal: normal, strength/ROM intact


Neurological: normal, sensory/motor intact, A&O x3


Psychological: affect/mood appropriate





Triage Information Reviewed: Yes


Vital Signs On Initial Exam: 


 Initial Vitals











Temp Pulse Resp BP Pulse Ox


 


 97.8 F   108   20   116/81   97 


 


 09/26/17 21:37  09/26/17 21:37  09/26/17 21:37  09/26/17 21:37  09/26/17 21:37











Vital Signs Reviewed: Yes





- Maurisio Coma Scale


Coma Scale Total: 15





Diagnostics





- Vital Signs


 Vital Signs











  Temp Pulse Resp BP Pulse Ox


 


 09/26/17 23:00   101  19  128/81  96


 


 09/26/17 22:45   102  16   97


 


 09/26/17 22:42     110/80 


 


 09/26/17 21:37  97.8 F  108  20  116/81  97














- Laboratory


Lab Results: 


 Lab Results











  09/26/17 09/26/17 09/26/17 Range/Units





  23:15 23:15 23:15 


 


WBC     (3.5-10.8)  10^3/ul


 


RBC     (4.0-5.4)  10^6/ul


 


Hgb     (12.0-16.0)  g/dl


 


Hct     (35-47)  %


 


MCV     (80-97)  fL


 


MCH     (27-31)  pg


 


MCHC     (31-36)  g/dl


 


RDW     (10.5-15)  %


 


Plt Count     (150-450)  10^3/ul


 


MPV     (7.4-10.4)  um3


 


Neut % (Auto)     (38-83)  %


 


Lymph % (Auto)     (25-47)  %


 


Mono % (Auto)     (1-9)  %


 


Eos % (Auto)     (0-6)  %


 


Baso % (Auto)     (0-2)  %


 


Absolute Neuts (auto)     (1.5-7.7)  10^3/ul


 


Absolute Lymphs (auto)     (1.0-4.8)  10^3/ul


 


Absolute Monos (auto)     (0-0.8)  10^3/ul


 


Absolute Eos (auto)     (0-0.6)  10^3/ul


 


Absolute Basos (auto)     (0-0.2)  10^3/ul


 


Absolute Nucleated RBC     10^3/ul


 


Nucleated RBC %     


 


INR (Anticoag Therapy)  0.97    (0.89-1.11)  


 


APTT  31.4    (26.0-36.3)  seconds


 


Sodium    137  (133-145)  mmol/L


 


Potassium    3.6  (3.5-5.0)  mmol/L


 


Chloride    101  (101-111)  mmol/L


 


Carbon Dioxide    29  (22-32)  mmol/L


 


Anion Gap    7  (2-11)  mmol/L


 


BUN    15  (6-24)  mg/dL


 


Creatinine    0.88  (0.51-0.95)  mg/dL


 


Est GFR ( Amer)    84.3  (>60)  


 


Est GFR (Non-Af Amer)    65.5  (>60)  


 


BUN/Creatinine Ratio    17.0  (8-20)  


 


Glucose    135 H  ()  mg/dL


 


Lactic Acid     (0.5-2.0)  mmol/L


 


Calcium    9.1  (8.6-10.3)  mg/dL


 


Magnesium    1.9  (1.9-2.7)  mg/dL


 


Total Bilirubin    0.20  (0.2-1.0)  mg/dL


 


AST    14  (13-39)  U/L


 


ALT    13  (7-52)  U/L


 


Alkaline Phosphatase    75  ()  U/L


 


Total Creatine Kinase    39  ()  U/L


 


CK-MB (CK-2)    2.1  (0.6-6.3)  ng/mL


 


Troponin I    0.01  (<0.04)  ng/mL


 


C-Reactive Protein    11.02 H  (< 5.00)  mg/L


 


B-Natriuretic Peptide   16  ( - 100) pg/mL


 


Total Protein    7.1  (6.4-8.9)  g/dL


 


Albumin    3.9  (3.2-5.2)  g/dL


 


Globulin    3.2  (2-4)  g/dL


 


Albumin/Globulin Ratio    1.2  (1-3)  


 


Lipase    10 L  (11.0-82.0)  U/L


 


TSH    Pending  














  09/26/17 09/26/17 Range/Units





  23:15 23:15 


 


WBC  6.1   (3.5-10.8)  10^3/ul


 


RBC  3.55 L   (4.0-5.4)  10^6/ul


 


Hgb  11.3 L   (12.0-16.0)  g/dl


 


Hct  33 L   (35-47)  %


 


MCV  94   (80-97)  fL


 


MCH  32 H   (27-31)  pg


 


MCHC  34   (31-36)  g/dl


 


RDW  14   (10.5-15)  %


 


Plt Count  210   (150-450)  10^3/ul


 


MPV  7 L   (7.4-10.4)  um3


 


Neut % (Auto)  69.0   (38-83)  %


 


Lymph % (Auto)  18.1 L   (25-47)  %


 


Mono % (Auto)  8.8   (1-9)  %


 


Eos % (Auto)  3.6   (0-6)  %


 


Baso % (Auto)  0.5   (0-2)  %


 


Absolute Neuts (auto)  4.2   (1.5-7.7)  10^3/ul


 


Absolute Lymphs (auto)  1.1   (1.0-4.8)  10^3/ul


 


Absolute Monos (auto)  0.5   (0-0.8)  10^3/ul


 


Absolute Eos (auto)  0.2   (0-0.6)  10^3/ul


 


Absolute Basos (auto)  0   (0-0.2)  10^3/ul


 


Absolute Nucleated RBC  0   10^3/ul


 


Nucleated RBC %  0   


 


INR (Anticoag Therapy)    (0.89-1.11)  


 


APTT    (26.0-36.3)  seconds


 


Sodium    (133-145)  mmol/L


 


Potassium    (3.5-5.0)  mmol/L


 


Chloride    (101-111)  mmol/L


 


Carbon Dioxide    (22-32)  mmol/L


 


Anion Gap    (2-11)  mmol/L


 


BUN    (6-24)  mg/dL


 


Creatinine    (0.51-0.95)  mg/dL


 


Est GFR ( Amer)    (>60)  


 


Est GFR (Non-Af Amer)    (>60)  


 


BUN/Creatinine Ratio    (8-20)  


 


Glucose    ()  mg/dL


 


Lactic Acid   1.4  (0.5-2.0)  mmol/L


 


Calcium    (8.6-10.3)  mg/dL


 


Magnesium    (1.9-2.7)  mg/dL


 


Total Bilirubin    (0.2-1.0)  mg/dL


 


AST    (13-39)  U/L


 


ALT    (7-52)  U/L


 


Alkaline Phosphatase    ()  U/L


 


Total Creatine Kinase    ()  U/L


 


CK-MB (CK-2)    (0.6-6.3)  ng/mL


 


Troponin I    (<0.04)  ng/mL


 


C-Reactive Protein    (< 5.00)  mg/L


 


B-Natriuretic Peptide   ( - 100) pg/mL


 


Total Protein    (6.4-8.9)  g/dL


 


Albumin    (3.2-5.2)  g/dL


 


Globulin    (2-4)  g/dL


 


Albumin/Globulin Ratio    (1-3)  


 


Lipase    (11.0-82.0)  U/L


 


TSH    











Result Diagrams: 


 09/26/17 23:15





 09/26/17 23:15


Lab Statement: Any lab studies that have been ordered have been reviewed, and 

results considered in the medical decision making process.





- Radiology


  ** CXR


Radiology Interpretation Completed By: ED Physician - NAD





- EKG


  ** 2154


Cardiac Rate: Tachycardia - 


EKG Rhythm: Sinus Tachycardia


Ectopy: None





  ** [id number]


Cardiac Rate: NL - BPM 89


EKG Rhythm: Sinus Rhythm





Course/Dx





- Course


Course Of Treatment: WELL IN ED.  NO CHEST PAIN.  MN INTERVAL ELONGATION NOT 

NEW.  REPEAT TROPONIN NEGATIVE.  F/U DR POSADA TODAY; RETURN IF WORSE.





- Diagnoses


Provider Diagnoses: 


 Palpitations, Hyperthyroidism








- Physician Notifications


Discussed Care Of Patient With: Fred Frankenberg


Time Discussed With Above Provider: 06:32


Instructed by Provider To: Other - Consulted Dr. Frankenberg (hospitalist) 

regarding the patient's case.





Discharge





- Discharge Plan


Condition: Stable


Disposition: HOME


Patient Education Materials:  Palpitations (ED), Hyperthyroidism (ED)


Referrals: 


Serjio Posada MD [Primary Care Provider] - 


Additional Instructions: 


FOLLOW UP WITH YOUR DOCTOR. CALL TODAY.


RETURN TO THE EMERGENCY DEPARTMENT FOR ANY WORSENING OF YOUR CONDITION; CHEST 

PAIN, SHORTNESS OF BREATH, RACING HEART, YOU FEEL LIKE YOU ARE GOING TO PASS 

OUT OR QUESTIONS OR CONCERNS.





The documentation as recorded by the Brent shirley Alfonso accurately reflects 

the service I personally performed and the decisions made by me, Jerry Ryan MD.

## 2017-11-28 ENCOUNTER — HOSPITAL ENCOUNTER (EMERGENCY)
Dept: HOSPITAL 25 - ED | Age: 62
Discharge: HOME | End: 2017-11-28
Payer: COMMERCIAL

## 2017-11-28 VITALS — SYSTOLIC BLOOD PRESSURE: 116 MMHG | DIASTOLIC BLOOD PRESSURE: 74 MMHG

## 2017-11-28 DIAGNOSIS — R11.0: ICD-10-CM

## 2017-11-28 DIAGNOSIS — H66.90: Primary | ICD-10-CM

## 2017-11-28 DIAGNOSIS — R51: ICD-10-CM

## 2017-11-28 LAB
ALBUMIN SERPL BCG-MCNC: 4.1 G/DL (ref 3.2–5.2)
ALP SERPL-CCNC: 68 U/L (ref 34–104)
ALT SERPL W P-5'-P-CCNC: 16 U/L (ref 7–52)
ANION GAP SERPL CALC-SCNC: 9 MMOL/L (ref 2–11)
AST SERPL-CCNC: 17 U/L (ref 13–39)
BUN SERPL-MCNC: 12 MG/DL (ref 6–24)
BUN/CREAT SERPL: 12.1 (ref 8–20)
CALCIUM SERPL-MCNC: 9.1 MG/DL (ref 8.6–10.3)
CHLORIDE SERPL-SCNC: 100 MMOL/L (ref 101–111)
GLOBULIN SER CALC-MCNC: 2.9 G/DL (ref 2–4)
GLUCOSE SERPL-MCNC: 83 MG/DL (ref 70–100)
HCO3 SERPL-SCNC: 28 MMOL/L (ref 22–32)
HCT VFR BLD AUTO: 37 % (ref 35–47)
HGB BLD-MCNC: 12.7 G/DL (ref 12–16)
MCH RBC QN AUTO: 31 PG (ref 27–31)
MCHC RBC AUTO-ENTMCNC: 34 G/DL (ref 31–36)
MCV RBC AUTO: 90 FL (ref 80–97)
POTASSIUM SERPL-SCNC: 3.6 MMOL/L (ref 3.5–5)
PROT SERPL-MCNC: 7 G/DL (ref 6.4–8.9)
RBC # BLD AUTO: 4.13 10^6/UL (ref 4–5.4)
SODIUM SERPL-SCNC: 137 MMOL/L (ref 133–145)
TROPONIN I SERPL-MCNC: 0 NG/ML (ref ?–0.04)
WBC # BLD AUTO: 5.4 10^3/UL (ref 3.5–10.8)

## 2017-11-28 PROCEDURE — 84484 ASSAY OF TROPONIN QUANT: CPT

## 2017-11-28 PROCEDURE — 85730 THROMBOPLASTIN TIME PARTIAL: CPT

## 2017-11-28 PROCEDURE — 99282 EMERGENCY DEPT VISIT SF MDM: CPT

## 2017-11-28 PROCEDURE — 93005 ELECTROCARDIOGRAM TRACING: CPT

## 2017-11-28 PROCEDURE — 36415 COLL VENOUS BLD VENIPUNCTURE: CPT

## 2017-11-28 PROCEDURE — 80053 COMPREHEN METABOLIC PANEL: CPT

## 2017-11-28 PROCEDURE — 70450 CT HEAD/BRAIN W/O DYE: CPT

## 2017-11-28 PROCEDURE — 85025 COMPLETE CBC W/AUTO DIFF WBC: CPT

## 2017-11-28 PROCEDURE — 85610 PROTHROMBIN TIME: CPT

## 2017-11-28 NOTE — ED
Walter ROSS Gabriel, scribed for Jeison Jin on 11/28/17 at 2026 .





Headache





- HPI Summary


HPI Summary: 





 This patient is a 61 year old F presenting to [facility] with a chief complaint of 

HA since last 5 days. The patient rates the pain 8/10 in severity and describes 

it as in her forehead and radiating into her eyes. Symptoms alleviated by 

nothing. Patient has taken Tylenol with no relief. Patient reports nausea and 

dizziness. Patient denies fevers, cp, sob, and abd pain.





- History Of Current Complaint


Chief Complaint: EDHeadache


Stated Complaint: HEAD PAIN, PRESSURE BEHIND THE EYES


Time Seen by Provider: 11/28/17 20:05


Hx Obtained From: Patient


Onset/Duration: Started days ago - 5, Still Present


Timing: Constant


Character: Pressure


Location of Headache: Frontal


Allevating Factors: Nothing


Associated Signs And Symptoms: Negative - fever, abd pain, cp, and sob





- Allergies/Home Medications


Allergies/Adverse Reactions: 


 Allergies











Allergy/AdvReac Type Severity Reaction Status Date / Time


 


Sulfa Drugs Allergy Intermediate Hives Verified 09/26/17 21:41


 


CONTRAST Allergy  See Comment Uncoded 09/26/17 21:41














PMH/Surg Hx/FS Hx/Imm Hx


Previously Healthy: No


Endocrine/Hematology History: Reports: Hx Thyroid Disease - HYPOTHYROID


   Denies: Hx Diabetes, Hx Anemia


Cardiovascular History: Reports: Hx Valvular Heart Disease - MV prolapse, Other 

Cardiovascular Problems/Disorders - VALVULAR HEART DISEASE(MV PROLAPSE)


   Denies: Hx Congestive Heart Failure, Hx Hypertension, Hx Pacemaker/ICD


Respiratory History: Reports: Other Respiratory Problems/Disorders - LUNG CA, 

PT.STATES?LT.LUNG, JUST DX'D EARLIER THIS YEAR


GI History: Reports: Hx Gastroesophageal Reflux Disease, Hx Hiatal Hernia, 

Other GI Disorders - GERD, ? IBS


   Denies: Hx Jaundice


 History: 


   Denies: Hx Dialysis, Hx Renal Disease


Musculoskeletal History: Reports: Hx Arthritis - osteoarthritis in lower back, 

Hx Orthopedic Injury


Sensory History: Reports: Hx Contacts or Glasses


   Denies: Hx Hearing Aid


Opthamlomology History: Reports: Hx Contacts or Glasses


Neurological History: Reports: Hx Nerve Disease - fibromyalgia, Other Neuro 

Impairments/Disorders - L5-S1 PAIN INJECTION


Psychiatric History: Reports: Hx Anxiety, Hx Depression


   Denies: Hx Panic Disorder





- Cancer History


Cancer Type, Location and Year: LUNG CA - NEWLY DX


Hx Chemotherapy: No


Hx Radiation Therapy: No





- Surgical History


Surgery Procedure, Year, and Place: SEVERAL FOOT SURGERIES-([facility]YSON) [facility].  MENISCUS 

REPAIR- Lt KNEE -.  Partial thyroidectomy (cyst).  Rt HAND- REMOVED A PIECE OF 

BONE


Hx Anesthesia Reactions: No





- Immunization History


Date of Tetanus Vaccine: unk


Date of Influenza Vaccine: unk


Infectious Disease History: No


Infectious Disease History: 


   Denies: Traveled Outside the [place] in Last 30 Days





- Family History


Known Family History: Positive: Renal Disease, Other - arthritis, CA





- Social History


Alcohol Use: Occasionally


Alcohol Amount: 2-3 beers a couple times per week- pt seems evasive


Hx Substance Use: No


Substance Use Type: Reports: None


Substance Use Comment - Amount & Last Used: Tramadol ER


Hx Tobacco Use: Yes


Smoking Status (MU): Former Smoker


Type: Cigarettes


Amount Used/How Often: 1 ppd


Have You Smoked in the Last Year: Yes





Review of Systems


Negative: Fever


Negative: Chest Pain


Negative: Shortness Of Breath


Positive: Nausea.  Negative: Abdominal Pain


Neurological: Other - dizziness 


Positive: Headache


All Other Systems Reviewed And Are Negative: Yes





Physical Exam





- Summary


Physical Exam Summary: 





Appearance: Well appearing, no pain distress


Skin: warm, dry, reflects adequate perfusion


Head/face: normal


Eyes: EOMI, ROSAMARIA


ENT:  Left ear tm red and dull


Neck: supple, non-tender


Respiratory: CTA, breath sounds present


Cardiovascular: RRR, pulses symmetrical 


Abdomen: non-tender, soft


Bowel: present


Musculoskeletal: normal, strength/ROM intact


Neuro: normal, sensory motor intact, A&Ox3


Triage Information Reviewed: Yes


Vital Signs On Initial Exam: 


 Initial Vitals











Temp Pulse Resp BP Pulse Ox


 


 97.6 F   96   16   129/77   100 


 


 11/28/17 16:20  11/28/17 16:20  11/28/17 16:20  11/28/17 16:20  11/28/17 16:20











Vital Signs Reviewed: Yes





- Maurisio Coma Scale


Coma Scale Total: 15





Diagnostics





- Vital Signs


 Vital Signs











  Temp Pulse Resp BP Pulse Ox


 


 11/28/17 16:20  97.6 F  96  16  129/77  100














- Laboratory


Lab Results: 


 Lab Results











  11/28/17 11/28/17 11/28/17 Range/Units





  20:37 20:37 20:37 


 


WBC   5.4   (3.5-10.8)  10^3/ul


 


RBC   4.13   (4.0-5.4)  10^6/ul


 


Hgb   12.7   (12.0-16.0)  g/dl


 


Hct   37   (35-47)  %


 


MCV   90   (80-97)  fL


 


MCH   31   (27-31)  pg


 


MCHC   34   (31-36)  g/dl


 


RDW   15   (10.5-15)  %


 


Plt Count   226   (150-450)  10^3/ul


 


MPV   7 L   (7.4-10.4)  um3


 


Neut % (Auto)   64.3   (38-83)  %


 


Lymph % (Auto)   24.8 L   (25-47)  %


 


Mono % (Auto)   8.6   (1-9)  %


 


Eos % (Auto)   1.3   (0-6)  %


 


Baso % (Auto)   1.0   (0-2)  %


 


Absolute Neuts (auto)   3.4   (1.5-7.7)  10^3/ul


 


Absolute Lymphs (auto)   1.3   (1.0-4.8)  10^3/ul


 


Absolute Monos (auto)   0.5   (0-0.8)  10^3/ul


 


Absolute Eos (auto)   0.1   (0-0.6)  10^3/ul


 


Absolute Basos (auto)   0.1   (0-0.2)  10^3/ul


 


Absolute Nucleated RBC   0   10^3/ul


 


Nucleated RBC %   0   


 


INR (Anticoag Therapy)  0.98    (0.89-1.11)  


 


APTT  27.5    (26.0-36.3)  seconds


 


Sodium    137  (133-145)  mmol/L


 


Potassium    3.6  (3.5-5.0)  mmol/L


 


Chloride    100 L  (101-111)  mmol/L


 


Carbon Dioxide    28  (22-32)  mmol/L


 


Anion Gap    9  (2-11)  mmol/L


 


BUN    12  (6-24)  mg/dL


 


Creatinine    0.99 H  (0.51-0.95)  mg/dL


 


Est GFR ( Amer)    73.6  (>60)  


 


Est GFR (Non-Af Amer)    57.2  (>60)  


 


BUN/Creatinine Ratio    12.1  (8-20)  


 


Glucose    83  ()  mg/dL


 


Calcium    9.1  (8.6-10.3)  mg/dL


 


Total Bilirubin    0.50  (0.2-1.0)  mg/dL


 


AST    17  (13-39)  U/L


 


ALT    16  (7-52)  U/L


 


Alkaline Phosphatase    68  ()  U/L


 


Troponin I    0.00  (<0.04)  ng/mL


 


Total Protein    7.0  (6.4-8.9)  g/dL


 


Albumin    4.1  (3.2-5.2)  g/dL


 


Globulin    2.9  (2-4)  g/dL


 


Albumin/Globulin Ratio    1.4  (1-3)  











Result Diagrams: 


 11/28/17 20:37





 11/28/17 20:37


Lab Statement: Any lab studies that have been ordered have been reviewed, and 

results considered in the medical decision making process.





- CT


  ** CT brain


CT Interpretation Completed By: Radiologist - , NEGATIVE EXAMINATION ED 

physician has reviewed this radiology report and agrees.





- EKG


  ** 20:34


Cardiac Rate: NL


EKG Rhythm: Sinus Rhythm - NSR at 88 BPM


EKG Interpretation: no acute changes 





Headache Course/Dx





- Course


Assessment/Plan: This patient is a 61 year old F presenting to [facility] with a 

chief complaint of HA since last 5 days. An EKG reveals NSR. CT brain reveals, 

per radiologist, NEGATIVE EXAMINATION.  Blood was drawn with no significant 

abnormalities. In the ED course the patient was given Amoxicillin, Oxycodone, 

and Acetaminophen. Patient will be discharged with prescription for amoxicillin 

and follow up from Dr. Bassett. Pt was diagnoses with otitis media.  The patient 

is agreeable with this plan





- Diagnoses


Differential Diagnosis/HQI/PQRI: Subdural Hematoma, Migraine, Sinus Headache


Provider Diagnoses: 


 Otitis media








Discharge





- Discharge Plan


Condition: Stable


Disposition: HOME


Prescriptions: 


Acetaminophen TAB* [Tylenol TAB*] 650 mg PO Q6H PRN #30 tab MDD 3


 PRN Reason: Pain


Amoxicillin PO (*) [Amoxicillin 875 MG (*)] 875 mg PO BID #20 tab


Patient Education Materials:  Acetaminophen (By mouth), Amoxicillin (By mouth), 

Otitis Media (ED)


Referrals: 


Serjio Chaudhry MD [Primary Care Provider] - 3 Days


Additional Instructions: 


RETURN TO THE EMERGENCY DEPARTMENT FOR CHANGING OR WORSENING SYMPTOMS.





The documentation as recorded by the Walter shirley Gabriel accurately reflects 

the service I personally performed and the decisions made by me, Jeison Jin.

## 2017-11-28 NOTE — RAD
INDICATION: Headaches     



COMPARISON: None



TECHNIQUE: Noncontrast axial source images were acquired from the skull base to the

vertex.



FINDINGS:



Ventricles/sulci: The ventricles and cisterns are normal in size and configuration for

age.



Brain parenchyma: There is no focal parenchymal finding, evidence of intracranial mass, 

or intracranial mass effect.



Intracranial hemorrhage:None.



Extra-axial spaces: There are no abnormal extra axial fluid collections or evidence of

extra-axial mass.



Calvarium: There is no calvarial fracture or other calvarial abnormality.



Scalp: There is no evidence of scalp or extracalvarial soft tissue abnormality.



Paranasal sinuses/mastoid: The paranasal sinuses and mastoid air cells are clear.



Other: None.



IMPRESSION: NEGATIVE EXAMINATION

## 2018-02-14 ENCOUNTER — HOSPITAL ENCOUNTER (EMERGENCY)
Dept: HOSPITAL 25 - ED | Age: 63
Discharge: HOME | End: 2018-02-14
Payer: COMMERCIAL

## 2018-02-14 VITALS — DIASTOLIC BLOOD PRESSURE: 82 MMHG | SYSTOLIC BLOOD PRESSURE: 116 MMHG

## 2018-02-14 DIAGNOSIS — K21.9: ICD-10-CM

## 2018-02-14 DIAGNOSIS — S09.90XA: Primary | ICD-10-CM
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
## 2018-02-14 DIAGNOSIS — E03.9: ICD-10-CM

## 2018-02-14 DIAGNOSIS — Z87.891: ICD-10-CM

## 2018-02-14 DIAGNOSIS — Z85.118: ICD-10-CM

## 2018-02-14 DIAGNOSIS — M79.7: ICD-10-CM

## 2018-02-14 DIAGNOSIS — I34.1: ICD-10-CM
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
## 2018-02-14 PROCEDURE — 99282 EMERGENCY DEPT VISIT SF MDM: CPT

## 2018-02-14 PROCEDURE — 70450 CT HEAD/BRAIN W/O DYE: CPT

## 2018-02-14 NOTE — ED
Walter ROSS Gabriel, scribed for Trent Laguna MD on 02/14/18 at 1641 .





Adult Trauma





- HPI Summary


HPI Summary: 





This patient is a 62 [facility] 

occurred yesterday. Pt tripped and hit the left side of her head, left arm, 

left wrist, and left knee. The patient rates the pain 6/10 in severity. Patient 

reports left sided HA and left arm pain. Patient denies decreased mental acuity 

and LOC. Pt went to the chiropractor yesterday and her neck is sore due to 

this. Pt recently had radiation treatment. 








- History of Current Complaint


Chief Complaint: EDHeadInjury


Stated Complaint: FALL/LT SIDE PAIN


Time Seen by Provider: 02/14/18 16:21


Hx Obtained From: Patient


Mechanism of Injury: Fall


Ambulatory at the Scene: Yes


Loss of Consciousness: no loss of consciousness


Onset/Duration: Still Present


Onset of Pain: Post Accident


Onset Severity: Mild


Current Severity: Moderate


Pain Intensity: 6


Pain Scale Used: 0-10 Numeric


Location: Head, Neck, Other - left arm


Associated Signs & Symptoms: Positive: Other: - HA and left arm pain.  Negative

: Loss of Consciousness, Memory Loss





- Allergy/Home Medications


Allergies/Adverse Reactions: 


 Allergies











Allergy/AdvReac Type Severity Reaction Status Date / Time


 


MS Sulfa Drugs [Sulfa Drugs] Allergy Intermediate Hives Verified 02/14/18 16:18


 


MS Gadolinium [Gadolinium] Allergy  Unknown Verified 02/14/18 16:18





   Reaction  





   Details  


 


Adhesive Tape AdvReac  Rash Verified 02/14/18 16:18














PMH/Surg Hx/FS Hx/Imm Hx


Endocrine/Hematology History: Reports: Hx Thyroid Disease - HYPOTHYROID


   Denies: Hx Diabetes, Hx Anemia


Cardiovascular History: Reports: Hx Valvular Heart Disease - MV prolapse, Other 

Cardiovascular Problems/Disorders - VALVULAR HEART DISEASE(MV PROLAPSE)


   Denies: Hx Congestive Heart Failure, Hx Hypertension, Hx Pacemaker/ICD


Respiratory History: Reports: Other Respiratory Problems/Disorders - LUNG CA, 

PT.STATES?LT.LUNG, JUST DX'D EARLIER THIS YEAR


GI History: Reports: Hx Gastroesophageal Reflux Disease, Hx Hiatal Hernia, 

Other GI Disorders - GERD, ? IBS


   Denies: Hx Jaundice


 History: 


   Denies: Hx Dialysis, Hx Renal Disease


Musculoskeletal History: Reports: Hx Arthritis - osteoarthritis in lower back, 

Hx Orthopedic Injury


Sensory History: Reports: Hx Contacts or Glasses


   Denies: Hx Hearing Aid


Opthamlomology History: Reports: Hx Contacts or Glasses


Neurological History: Reports: Hx Nerve Disease - fibromyalgia, Other Neuro 

Impairments/Disorders - L5-S1 PAIN INJECTION


Psychiatric History: Reports: Hx Anxiety, Hx Depression


   Denies: Hx Panic Disorder





- Cancer History


Cancer Type, Location and Year: LUNG CA


Hx Chemotherapy: No


Hx Radiation Therapy: No





- Surgical History


Surgery Procedure, Year, and Place: SEVERAL FOOT SURGERIES-([facility]) [facility].  MENISCUS 

REPAIR- Lt KNEE -.  Partial thyroidectomy (cyst).  Rt HAND- REMOVED A PIECE OF 

BONE


Hx Anesthesia Reactions: No





- Immunization History


Date of Tetanus Vaccine: unk


Date of Influenza Vaccine: unk


Infectious Disease History: No


Infectious Disease History: 


   Denies: Traveled Outside the [place] in Last 30 Days





- Family History


Known Family History: Positive: Renal Disease, Other - arthritis, CA





- Social History


Alcohol Use: Occasionally


Alcohol Amount: 2-3 beers a couple times per week- pt seems evasive


Hx Substance Use: No


Substance Use Type: Reports: None


Substance Use Comment - Amount & Last Used: Tramadol ER


Hx Tobacco Use: Yes


Smoking Status (MU): Former Smoker


Type: Cigarettes


Amount Used/How Often: 1 ppd


Have You Smoked in the Last Year: Yes





Review of Systems


Positive: Other - left arm pain 


Neurological: Negative - LOC 


Positive: Headache


All Other Systems Reviewed And Are Negative: Yes





Physical Exam





- Summary


Physical Exam Summary: 





Appearance: The patient is well-nourished in no acute distress and in no acute 

pain.


 


Skin: The skin is warm and dry and skin color reflects adequate perfusion.


 


HEENT:  The head is normocephalic and atraumatic. The pupils are equal and 

reactive. The conjunctivae are clear and without drainage.  Nares are patent 

and without drainage.  Mouth reveals moist mucous membranes and the throat is 

without erythema and exudate.  The external ears are intact. The ear canals are 

patent and without drainage. The tympanic membranes are intact.


 


Neck: the neck is supple with full range of motion and non-tender. There are no 

carotid bruits.  There is no neck vein distension.


 


Respiratory: Chest is non-tender.  Lungs are clear to auscultation and breath 

sounds are symmetrical and equal.


 


Cardiovascular: Heart is regular rate and rhythm.  There is no murmur or rub 

auscultated.   There is no peripheral edema and pulses are symmetrical and 

equal.


 


Abdomen: The abdomen is soft and non-tender.  There are normal bowel sounds 

heard in all four quadrants and there is no organomegaly palpated.


 


Musculoskeletal: There is no back tenderness noted.  Extremities are non-tender 

with full range of motion.  There is good capillary refill.  There is no 

peripheral edema or calf tenderness elicited.


 


Neurological: Patient is alert and oriented to person, place and time.  The 

patient has symmetrical motor strength in all four extremities.  Cranial nerves 

are grossly intact. Deep tendon reflexes are symmetrical and equal in all four 

extremities.


 


Psychiatric: The patient has an appropriate affect and does not exhibit any 

anxiety or depression.





Triage Information Reviewed: Yes


Vital Signs On Initial Exam: 


 Initial Vitals











Temp Pulse Resp BP Pulse Ox


 


 98.2 F   103   16   116/82   97 


 


 02/14/18 16:14  02/14/18 16:14  02/14/18 16:14  02/14/18 16:14  02/14/18 16:14











Vital Signs Reviewed: Yes





Diagnostics





- Vital Signs


 Vital Signs











  Temp Pulse Resp BP Pulse Ox


 


 02/14/18 16:14  98.2 F  103  16  116/82  97














- Laboratory


Lab Statement: Any lab studies that have been ordered have been reviewed, and 

results considered in the medical decision making process.





- CT


  ** CT Brain


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 

No intracranial mass or hemorrhage is noted. ED physician has reviewed this 

radiology report.





Adult Trauma Course/Dx





- Course


Course Of Treatment: Ms. Kang presented C/O falling yesterday and hitting her 

head and left side.  She still has a left sided HA and left wrist, shoulder and 

leg pain.  She was n't really tender to palpation when distracted and a CT of 

her head was normal.  I recomended symptomatic treatment.





- Diagnoses


Provider Diagnoses: 


 Head injury








Discharge





- Discharge Plan


Condition: Stable


Disposition: HOME


Patient Education Materials:  Head Injury (ED)


Referrals: 


Serjio Chaudhry MD [Primary Care Provider] - 4 Days


Additional Instructions: 


RETURN TO EMERGENCY DEPARTMENT FOR ANY NEW OR WORSENING SYMPTOMS 


 








The documentation as recorded by the Walter shirley Gabriel accurately reflects 

the service I personally performed and the decisions made by me, Trent Laguna MD.

## 2018-04-18 ENCOUNTER — HOSPITAL ENCOUNTER (OUTPATIENT)
Dept: HOSPITAL 25 - ED | Age: 63
Setting detail: OBSERVATION
LOS: 1 days | Discharge: HOME | End: 2018-04-19
Attending: INTERNAL MEDICINE | Admitting: HOSPITALIST
Payer: COMMERCIAL

## 2018-04-18 DIAGNOSIS — I26.99: Primary | ICD-10-CM

## 2018-04-18 DIAGNOSIS — I34.1: ICD-10-CM

## 2018-04-18 DIAGNOSIS — C34.90: ICD-10-CM

## 2018-04-18 DIAGNOSIS — R51: ICD-10-CM

## 2018-04-18 DIAGNOSIS — K21.9: ICD-10-CM

## 2018-04-18 DIAGNOSIS — M79.7: ICD-10-CM

## 2018-04-18 DIAGNOSIS — R06.02: ICD-10-CM

## 2018-04-18 DIAGNOSIS — E03.9: ICD-10-CM

## 2018-04-18 DIAGNOSIS — F32.9: ICD-10-CM

## 2018-04-18 DIAGNOSIS — Z87.891: ICD-10-CM

## 2018-04-18 DIAGNOSIS — F41.9: ICD-10-CM

## 2018-04-18 DIAGNOSIS — R00.0: ICD-10-CM

## 2018-04-18 DIAGNOSIS — F03.90: ICD-10-CM

## 2018-04-18 LAB
BASOPHILS # BLD AUTO: 0 10^3/UL (ref 0–0.2)
EOSINOPHIL # BLD AUTO: 0.1 10^3/UL (ref 0–0.6)
HCT VFR BLD AUTO: 37 % (ref 35–47)
HGB BLD-MCNC: 12.5 G/DL (ref 12–16)
INR PPP/BLD: 0.96 (ref 0.77–1.02)
LYMPHOCYTES # BLD AUTO: 1.2 10^3/UL (ref 1–4.8)
MCH RBC QN AUTO: 32 PG (ref 27–31)
MCHC RBC AUTO-ENTMCNC: 34 G/DL (ref 31–36)
MCV RBC AUTO: 92 FL (ref 80–97)
MONOCYTES # BLD AUTO: 0.4 10^3/UL (ref 0–0.8)
NEUTROPHILS # BLD AUTO: 2.8 10^3/UL (ref 1.5–7.7)
NRBC # BLD AUTO: 0 10^3/UL
NRBC BLD QL AUTO: 0.1
PLATELET # BLD AUTO: 233 10^3/UL (ref 150–450)
RBC # BLD AUTO: 3.95 10^6/UL (ref 4–5.4)
WBC # BLD AUTO: 4.5 10^3/UL (ref 3.5–10.8)

## 2018-04-18 PROCEDURE — 82553 CREATINE MB FRACTION: CPT

## 2018-04-18 PROCEDURE — 96365 THER/PROPH/DIAG IV INF INIT: CPT

## 2018-04-18 PROCEDURE — 83690 ASSAY OF LIPASE: CPT

## 2018-04-18 PROCEDURE — 93005 ELECTROCARDIOGRAM TRACING: CPT

## 2018-04-18 PROCEDURE — 83735 ASSAY OF MAGNESIUM: CPT

## 2018-04-18 PROCEDURE — 86140 C-REACTIVE PROTEIN: CPT

## 2018-04-18 PROCEDURE — 96372 THER/PROPH/DIAG INJ SC/IM: CPT

## 2018-04-18 PROCEDURE — 99284 EMERGENCY DEPT VISIT MOD MDM: CPT

## 2018-04-18 PROCEDURE — 83880 ASSAY OF NATRIURETIC PEPTIDE: CPT

## 2018-04-18 PROCEDURE — 96375 TX/PRO/DX INJ NEW DRUG ADDON: CPT

## 2018-04-18 PROCEDURE — 82550 ASSAY OF CK (CPK): CPT

## 2018-04-18 PROCEDURE — 96361 HYDRATE IV INFUSION ADD-ON: CPT

## 2018-04-18 PROCEDURE — 83605 ASSAY OF LACTIC ACID: CPT

## 2018-04-18 PROCEDURE — 85610 PROTHROMBIN TIME: CPT

## 2018-04-18 PROCEDURE — 36415 COLL VENOUS BLD VENIPUNCTURE: CPT

## 2018-04-18 PROCEDURE — 85730 THROMBOPLASTIN TIME PARTIAL: CPT

## 2018-04-18 PROCEDURE — 85379 FIBRIN DEGRADATION QUANT: CPT

## 2018-04-18 PROCEDURE — 71275 CT ANGIOGRAPHY CHEST: CPT

## 2018-04-18 PROCEDURE — 84443 ASSAY THYROID STIM HORMONE: CPT

## 2018-04-18 PROCEDURE — 80053 COMPREHEN METABOLIC PANEL: CPT

## 2018-04-18 PROCEDURE — 71045 X-RAY EXAM CHEST 1 VIEW: CPT

## 2018-04-18 PROCEDURE — 84484 ASSAY OF TROPONIN QUANT: CPT

## 2018-04-18 PROCEDURE — 93970 EXTREMITY STUDY: CPT

## 2018-04-18 PROCEDURE — G0378 HOSPITAL OBSERVATION PER HR: HCPCS

## 2018-04-18 PROCEDURE — 85025 COMPLETE CBC W/AUTO DIFF WBC: CPT

## 2018-04-18 RX ADMIN — SODIUM CHLORIDE SCH MLS/HR: 900 IRRIGANT IRRIGATION at 23:59

## 2018-04-18 RX ADMIN — SODIUM CHLORIDE SCH MLS/HR: 900 IRRIGANT IRRIGATION at 18:08

## 2018-04-18 NOTE — HP
CC:  Serjio Chaudhry MD *

 

HISTORY AND PHYSICAL:

 

DATE OF ADMISSION:  18

 

TIME OF EVALUATION:  .

 

PRIMARY CARE PHYSICIAN:  Serjio Chaudhry MD

 

CHIEF COMPLAINT:  Heart racing and shortness of breath.

 

HISTORY OF PRESENT ILLNESS:  This is a 62-year-old female with recent diagnosis 
of small cell lung carcinoma, status post chemo and radiation, who states she 
is now under remission, who presents to the emergency room after having weeks 
of heart racing in the past few days, having worsening of shortness of breath.  
She states she had a physical with Dr. Chaudhry few days ago with an unremarkable 
workup at that time.  She also has noticed some swelling in her legs and some 
weight gain, but she admits to a poor diet and not eating well.  She denies any 
indigestion.  No nausea, vomiting, or diarrhea.  She does have some abdominal 
discomfort that she thinks is due to gluten intolerance.  However, she states 
that her heart has been racing for the past several weeks, it has been constant 
and over the past few days she beginning progressively short of breath.  No 
cough.  No fevers.  She does get bilateral breast pain that she feels is due to 
her fibromyalgia.  In the emergency room, patient had labs and imaging.  She 
was found to have pulmonary emboli.  She was given morphine 4 mg and was 
referred to the hospitalist service for further evaluation.

 

PAST MEDICAL HISTORY:

1.  History of small cell carcinoma of the right lung, status post chemo and 
radiation.  She states she had brain radiation as well.  She is followed by Dr. Neumann.  Per patient, she is in remission.

2.  Morbid obesity.

3.  History of tobacco use.

4.  Fibromyalgia.

5.  Anxiety.

6.  Depression.

7.  GERD.

8.  Hypothyroidism.

9.  Osteoarthritis.

10.  Question of cognitive impairment versus mild dementia. MEDICATIONS:

1.  Namenda XR 28 mg p.o. daily.

2.  Magnesium citrate 30 mL p.o. q.i.d.

3.  Synthroid 150 mcg p.o. daily.

4.  Vitamin D 1000 units daily.

5.  Xanax 0.5 mg p.o. t.i.d. as needed.

6.  Multivitamin daily.

7.  Tylenol 650 mg every 6 hours as needed.

8.  Tramadol 100 mg daily.

9.  Wellbutrin  mg p.o. b.i.d.

10.  Pepcid 20 mg p.o. b.i.d.

 

ALLERGIES:  ADHESIVE TAPE, CONTRAST, SULFA.

 

FAMILY HISTORY:  Father  in his 80s from dementia.  Mother  in 
early 60s related to end-stage renal disease and diabetes.

 

SOCIAL HISTORY:  Patient states she lives alone.  She recently retired from the 
police department.  She has 2 grown children.  She occasionally drinks.  She 
quit smoking back in 2017 when she was diagnosed with lung cancer.  At 
that time, she smoked a pack per day for 40 years.  She is independent of her 
ADLs.  When asking who her healthcare proxy was, she could not make a decision.
  Code status is full code.

 

REVIEW OF SYSTEMS:  A 14-point review of systems as mentioned in the HPI, 
otherwise negative.  In addition to patient states she has been having 
headaches with buzzing in her ears and feeling fuzzy and forgetful and has 
fallen twice recently.

 

                               PHYSICAL EXAMINATION

 

GENERAL:  No acute distress.  She is emotionally labile, crying off and on.

 

VITAL SIGNS:  Temp 98, pulse rate 84, respiratory rate 16, oxygen saturation 96
% on room air, blood pressure 132/83.

 

HEENT:  Head:  Normocephalic.  Pupils equal and reactive, anicteric.  Oropharynx
: Mucous membranes are moist.

 

NECK:  Supple.  No lymphadenopathy.

 

RESPIRATORY:  Clear to auscultation.  No wheezes, rhonchi, or rales.  
Diminished breath sounds.

 

CARDIAC:  Regular rate and rhythm with ectopic beats.  Soft systolic murmur 
heard throughout.

 

ABDOMEN:  Soft, nontender, and nondistended.

 

EXTREMITIES:  No clubbing, cyanosis, some trace edema, some mild asymmetry with 
mild enlargement of her right lower extremity compared to the left.

 

NEUROLOGIC:  Alert and oriented x3.  No gross focal neurological deficits.

 

LABORATORY DATA:  White count 4.5, hemoglobin 12.5, hematocrit 37, platelets 
233. INR 0.96.  D-dimer 288.  Sodium 139, potassium 3.8, chloride 101, bicarb 27
, BUN 8, creatinine 0.94, glucose 92, mag 1.8.  TSH 4.22.  Troponin is 0 x2.

 

RADIOGRAPHIC DATA:  EKG shows sinus tachycardia with a rate of 110 with PACs.  
CTA of the chest showed solitary left lower lobe basilar segmental pulmonary 
artery embolus, moderate sized central hernia and thickening of the wall at the 
mid and distal esophagus, nonspecific, although suggestive of esophagitis.  
Recommend clinical correlation.

 

Chest x-ray shows no evidence for acute disease.

 

ASSESSMENT AND PLAN:  This is a 62-year-old female with a past medical history 
of recent diagnosis of small cell lung carcinoma in remission, who presents 
with heart racing and shortness of breath, found to have a pulmonary embolus.

 

1.  Pulmonary embolus.  

Patient does not show any evidence of right heart strain. Her EKG and troponins 
are negative.  She is on room air.  She may have some asymmetry of her lower 
extremities with a recent cancer diagnosis, but in remission.  The plan will be 
to start her on Lovenox for now and deferred to Dr. Chaudhry in the morning for 
further management.  We will obtain Doppler of her lower extremities.  We will 
hold off on getting an echo with negative troponin and hemodynamically stable.



2.  Headaches following ears buzzing.  Patient states she was recently started 
on Namenda.  She thinks this is making her symptoms of her heart racing 
worsening.  We will get a head CT.  Consider PT eval.  There is concern for 
safety at home.



3.  Chronic medical problems.  Hypothyroidism.  Resume her Synthroid.



4.  Anxiety.  Resume her Xanax.



5.  Fibromyalgia.  Continue her tramadol.



6.  Gastroesophageal reflux disease.  Continue her Pepcid.



7.  Depression.  Continue Wellbutrin.



8.  Concern for dementia.  Continue Namenda.



9.  Abnormal CTA findings with thickening of the mid and distal esophagus.  
Patient does not seem to have symptoms correlated with this, would recommend 
followup with Dr. Chaudhry.



10.  FEN.  We will place the patient on a regular diet.



11.  DVT prophylaxis.  Patient is going to be started on Lovenox.  She is 
moderate risk.



12.  Code status.  Full code.

 

PATIENT TIME:  Greater than 50 minutes spent doing the history and physical, 
more than half the time was spent in direct patient contact.

 

 

 

[id number]/CPS #: [id number]

[facility]

## 2018-04-18 NOTE — ED
Davon ROSS Natalie, scribed for Trent Laguna MD on 04/18/18 at 1755 .





Shortness of Breath





- HPI Summary


HPI Summary: 


The pt is a 63 y/o F presenting to the ED c/o constant SOB and feeling of 

irregular heartbeat for three days. She states the pain is not like chest pain. 

The pain is rated 4/10 in severity. She additionally c/o sleep loss due to 

being afraid of not waking up. She has had similar episodes of this in the 

past. Hx: lung cancer, GERD, hypothyroid, fibromyalgia, arthritis





- History of Current Complaint


Chief Complaint: EDChestPainROMI


Time Seen by Provider: 04/18/18 17:31


Hx Obtained From: Patient


Onset/Duration: Gradual Onset, Lasting Days, Still Present


Timing: Constant


Current Severity: Moderate


Aggrevating Factors: Nothing


Alleviating Factors: Nothing





- Allergy/Home Medications


Allergies/Adverse Reactions: 


 Allergies











Allergy/AdvReac Type Severity Reaction Status Date / Time


 


adhesive tape Allergy  Rash Verified 04/18/18 18:13


 


Gadolinium-Containing Allergy  Unknown Verified 04/18/18 18:13





Contrast Medi   Reaction  





   Details  


 


Sulfa (Sulfonamide Allergy  Hives Verified 04/18/18 18:13





Antibiotics)     














PMH/Surg Hx/FS Hx/Imm Hx


Endocrine/Hematology History: Reports: Hx Thyroid Disease - HYPOTHYROID


   Denies: Hx Diabetes, Hx Anemia


Cardiovascular History: Reports: Hx Valvular Heart Disease - MV prolapse, Other 

Cardiovascular Problems/Disorders - VALVULAR HEART DISEASE(MV PROLAPSE)


   Denies: Hx Congestive Heart Failure, Hx Hypertension, Hx Pacemaker/ICD


Respiratory History: Reports: Other Respiratory Problems/Disorders - LUNG CA, 

PT.STATES?LT.LUNG, JUST DX'D EARLIER THIS YEAR


GI History: Reports: Hx Gastroesophageal Reflux Disease, Hx Hiatal Hernia, 

Other GI Disorders - GERD, ? IBS


   Denies: Hx Jaundice


 History: 


   Denies: Hx Dialysis, Hx Renal Disease


Musculoskeletal History: Reports: Hx Arthritis - osteoarthritis in lower back, 

Hx Orthopedic Injury


Sensory History: Reports: Hx Contacts or Glasses


   Denies: Hx Hearing Aid


Opthamlomology History: Reports: Hx Contacts or Glasses


Neurological History: Reports: Hx Nerve Disease - fibromyalgia, Other Neuro 

Impairments/Disorders - L5-S1 PAIN INJECTION


Psychiatric History: Reports: Hx Anxiety, Hx Depression


   Denies: Hx Panic Disorder





- Cancer History


Cancer Type, Location and Year: LUNG CA


Hx Chemotherapy: No


Hx Radiation Therapy: No





- Surgical History


Surgery Procedure, Year, and Place: SEVERAL FOOT SURGERIES-([facility]) [facility].  MENISCUS 

REPAIR- Lt [facility] -.  Partial thyroidectomy (cyst).  Rt HAND- REMOVED A PIECE OF 

BONE


Hx Anesthesia Reactions: No





- Immunization History


Date of Tetanus Vaccine: unk


Date of Influenza Vaccine: unk


Infectious Disease History: No


Infectious Disease History: 


   Denies: Traveled Outside the [place] in Last 30 Days





- Family History


Known Family History: Positive: Renal Disease, Other - arthritis, CA





- Social History


Alcohol Use: Occasionally


Alcohol Amount: 2-3 beers a couple times per week- pt seems evasive


Hx Substance Use: No


Substance Use Type: Reports: None


Substance Use Comment - Amount & Last Used: Tramadol ER


Hx Tobacco Use: Yes


Smoking Status (MU): Former Smoker


Type: Cigarettes


Amount Used/How Often: 1 ppd


Have You Smoked in the Last Year: Yes





Review of Systems


Positive: Other - feeling of irregular heartbeat.  Negative: Chest Pain


Positive: Shortness Of Breath


Neurological: Other - sleep loss


All Other Systems Reviewed And Are Negative: Yes





Physical Exam





- Summary


Physical Exam Summary: 


Appearance: The patient is well-nourished in no acute distress and in no acute 

pain.


 


Skin: The skin is warm and dry and skin color reflects adequate perfusion.


 


HEENT: The head is normocephalic and atraumatic. The pupils are equal and 

reactive. The conjunctivae are clear and without drainage. Nares are patent and 

without drainage. Mouth reveals moist mucous membranes and the throat is 

without erythema and exudate. The external ears are intact. The ear canals are 

patent and without drainage. The tympanic membranes are intact.


 


Neck: the neck is supple with full range of motion and non-tender. There are no 

carotid bruits. There is no neck vein distension.


 


Respiratory: Chest is non-tender. Lungs are clear to auscultation and breath 

sounds are symmetrical and equal.


 


Cardiovascular: Heart is regular rate and rhythm. There is no murmur or rub 

auscultated. There is no peripheral edema and pulses are symmetrical and equal.


 


Abdomen: The abdomen is soft and non-tender. There are normal bowel sounds 

heard in all four quadrants and there is no organomegaly palpated.


 


Musculoskeletal: There is no back tenderness noted. Extremities are non-tender 

with full range of motion. There is good capillary refill. There is no 

peripheral edema or calf tenderness elicited.


 


Neurological: Patient is alert and oriented to person, place and time. The 

patient has symmetrical motor strength in all four extremities. Cranial nerves 

are grossly intact. Deep tendon reflexes are symmetrical and equal in all four 

extremities.


 


Psychiatric: The patient has an appropriate affect and does not exhibit any 

anxiety or depression.


Triage Information Reviewed: Yes


Vital Signs On Initial Exam: 


 Initial Vitals











Temp Pulse Resp BP Pulse Ox


 


 98.0 F   96   20   131/77   98 


 


 04/18/18 17:06  04/18/18 17:06  04/18/18 17:06  04/18/18 17:06  04/18/18 17:06











Vital Signs Reviewed: Yes





Diagnostics





- Vital Signs


 Vital Signs











  Temp Pulse Resp BP Pulse Ox


 


 04/18/18 17:06  98.0 F  96  20  131/77  98














- Laboratory


Lab Results: 


 Lab Results











  04/18/18 04/18/18 04/18/18 Range/Units





  17:35 17:35 17:35 


 


WBC     (3.5-10.8)  10^3/ul


 


RBC     (4.0-5.4)  10^6/ul


 


Hgb     (12.0-16.0)  g/dl


 


Hct     (35-47)  %


 


MCV     (80-97)  fL


 


MCH     (27-31)  pg


 


MCHC     (31-36)  g/dl


 


RDW     (10.5-15)  %


 


Plt Count     (150-450)  10^3/ul


 


MPV     (7.4-10.4)  um3


 


Neut % (Auto)     (38-83)  %


 


Lymph % (Auto)     (25-47)  %


 


Mono % (Auto)     (0-7)  %


 


Eos % (Auto)     (0-6)  %


 


Baso % (Auto)     (0-2)  %


 


Absolute Neuts (auto)     (1.5-7.7)  10^3/ul


 


Absolute Lymphs (auto)     (1.0-4.8)  10^3/ul


 


Absolute Monos (auto)     (0-0.8)  10^3/ul


 


Absolute Eos (auto)     (0-0.6)  10^3/ul


 


Absolute Basos (auto)     (0-0.2)  10^3/ul


 


Absolute Nucleated RBC     10^3/ul


 


Nucleated RBC %     


 


INR (Anticoag Therapy)    0.96  (0.77-1.02)  


 


APTT    28.1  (26.0-36.3)  seconds


 


D-Dimer, Quantitative    288 H  (Less Than 230)  ng/mL


 


Sodium  139    (139-145)  mmol/L


 


Potassium  3.8    (3.5-5.0)  mmol/L


 


Chloride  101    (101-111)  mmol/L


 


Carbon Dioxide  27    (22-32)  mmol/L


 


Anion Gap  11    (2-11)  mmol/L


 


BUN  8    (6-24)  mg/dL


 


Creatinine  0.94    (0.51-0.95)  mg/dL


 


Est GFR ( Amer)  77.9    (>60)  


 


Est GFR (Non-Af Amer)  60.5    (>60)  


 


BUN/Creatinine Ratio  8.5    (8-20)  


 


Glucose  92    ()  mg/dL


 


Lactic Acid     (0.5-2.0)  mmol/L


 


Calcium  9.2    (8.6-10.3)  mg/dL


 


Magnesium  1.8 L    (1.9-2.7)  mg/dL


 


Total Bilirubin  0.50    (0.2-1.0)  mg/dL


 


AST  16    (13-39)  U/L


 


ALT  14    (7-52)  U/L


 


Alkaline Phosphatase  72    ()  U/L


 


Total Creatine Kinase  60    ()  U/L


 


CK-MB (CK-2)  3.6    (0.6-6.3)  ng/mL


 


Troponin I  0.00    (<0.04)  ng/mL


 


C-Reactive Protein  5.97 H    (< 5.00)  mg/L


 


B-Natriuretic Peptide   22  ( - 100) pg/mL


 


Total Protein  6.9    (6.4-8.9)  g/dL


 


Albumin  4.1    (3.2-5.2)  g/dL


 


Globulin  2.8    (2-4)  g/dL


 


Albumin/Globulin Ratio  1.5    (1-3)  


 


Lipase  11    (11.0-82.0)  U/L


 


TSH  4.22    (0.34-5.60)  mcIU/mL














  04/18/18 04/18/18 04/18/18 Range/Units





  17:35 17:35 20:10 


 


WBC  4.5    (3.5-10.8)  10^3/ul


 


RBC  3.95 L    (4.0-5.4)  10^6/ul


 


Hgb  12.5    (12.0-16.0)  g/dl


 


Hct  37    (35-47)  %


 


MCV  92    (80-97)  fL


 


MCH  32 H    (27-31)  pg


 


MCHC  34    (31-36)  g/dl


 


RDW  14    (10.5-15)  %


 


Plt Count  233    (150-450)  10^3/ul


 


MPV  7.2 L    (7.4-10.4)  um3


 


Neut % (Auto)  62.5    (38-83)  %


 


Lymph % (Auto)  26.9    (25-47)  %


 


Mono % (Auto)  8.6 H    (0-7)  %


 


Eos % (Auto)  1.5    (0-6)  %


 


Baso % (Auto)  0.5    (0-2)  %


 


Absolute Neuts (auto)  2.8    (1.5-7.7)  10^3/ul


 


Absolute Lymphs (auto)  1.2    (1.0-4.8)  10^3/ul


 


Absolute Monos (auto)  0.4    (0-0.8)  10^3/ul


 


Absolute Eos (auto)  0.1    (0-0.6)  10^3/ul


 


Absolute Basos (auto)  0    (0-0.2)  10^3/ul


 


Absolute Nucleated RBC  0    10^3/ul


 


Nucleated RBC %  0.1    


 


INR (Anticoag Therapy)     (0.77-1.02)  


 


APTT     (26.0-36.3)  seconds


 


D-Dimer, Quantitative     (Less Than 230)  ng/mL


 


Sodium     (139-145)  mmol/L


 


Potassium     (3.5-5.0)  mmol/L


 


Chloride     (101-111)  mmol/L


 


Carbon Dioxide     (22-32)  mmol/L


 


Anion Gap     (2-11)  mmol/L


 


BUN     (6-24)  mg/dL


 


Creatinine     (0.51-0.95)  mg/dL


 


Est GFR ( Amer)     (>60)  


 


Est GFR (Non-Af Amer)     (>60)  


 


BUN/Creatinine Ratio     (8-20)  


 


Glucose     ()  mg/dL


 


Lactic Acid   1.0   (0.5-2.0)  mmol/L


 


Calcium     (8.6-10.3)  mg/dL


 


Magnesium     (1.9-2.7)  mg/dL


 


Total Bilirubin     (0.2-1.0)  mg/dL


 


AST     (13-39)  U/L


 


ALT     (7-52)  U/L


 


Alkaline Phosphatase     ()  U/L


 


Total Creatine Kinase     ()  U/L


 


CK-MB (CK-2)     (0.6-6.3)  ng/mL


 


Troponin I    0.00  (<0.04)  ng/mL


 


C-Reactive Protein     (< 5.00)  mg/L


 


B-Natriuretic Peptide    ( - 100) pg/mL


 


Total Protein     (6.4-8.9)  g/dL


 


Albumin     (3.2-5.2)  g/dL


 


Globulin     (2-4)  g/dL


 


Albumin/Globulin Ratio     (1-3)  


 


Lipase     (11.0-82.0)  U/L


 


TSH     (0.34-5.60)  mcIU/mL











Result Diagrams: 


 04/18/18 17:35





 04/18/18 17:35


Lab Statement: Any lab studies that have been ordered have been reviewed, and 

results considered in the medical decision making process.





- Radiology


  ** CXR


Xray Interpretation: No Acute Changes - No evidence for acute disease. ED 

physician has reviewed this report.


Radiology Interpretation Completed By: Radiologist





- CT


  ** CTA Chest/Thorax


CT Interpretation: Positive (See Comments) - 1. Solitary left lower lobe 

basilar segmental artery pulmonary embolus. 2. Moderate size central hernia and 

thickening of the wall of the mid and distal esophagus nonspecific although 

suggestive of esophagitis. Recommend clinical correlation. ED physician has 

reviewed this report.


CT Interpretation Completed By: Radiologist





- EKG


  ** 17:08


Cardiac Rate: Tachycardia


EKG Rhythm: Sinus Tachycardia - 110 BPM


EKG Interpretation: Ventricular bigeminy.





Course/Dx





- Course


Course Of Treatment: Ms. Kang has been feeling palpitations and SOB for a few 

days.  She was found to be in bigeminy and to have a small PE.  She is being 

admitted to the hospitalist service.





- Diagnoses


Provider Diagnoses: 


 Ventricular bigeminy, Pulmonary embolism








Discharge





- Sign-Out/Discharge


Documenting (check all that apply): Discharge





- Discharge Plan


Condition: Stable


Disposition: ADMITTED TO [facility]


Referrals: 


Serjio Chaudhry MD [Primary Care Provider] - 





- Billing Disposition and Condition


Condition: STABLE


Disposition: [facility]





The documentation as recorded by the Davon shirley Natalie accurately reflects 

the service I personally performed and the decisions made by me, Trent Laguna MD.

## 2018-04-18 NOTE — RAD
INDICATION:  Chest pain.



COMPARISON: Comparison is made with a prior CT of the chest from January 25, 2018.

Correlation is also made with a prior chest x-ray study from April 18, 2018.



TECHNIQUE: A CT angiogram of the chest was performed with intravenous following

intravenous injection of 83 ml of Omnipaque 350 nonionic contrast. Contiguous axial

sections were obtained from the lung apices through the lung bases. Images were

reconstructed in the coronal and sagittal planes.



FINDINGS: There is an intraluminal filling defect in a solitary left lower lobe basilar

segmental artery most consistent with a pulmonary embolus. No other emboli are seen.



The heart is within normal limits in size. No pericardial effusion is present.  The

thoracic aorta is normal in caliber and demonstrates homogeneous contrast opacification.



No significant enlarged mediastinal or hilar lymph nodes are seen.



There is a moderate size hiatal hernia. There is nonspecific circumferential thickening of

the wall of the mid and distal esophagus suggestive of esophagitis. 



There is a small linear density in the right upper lobe which is unchanged from the prior

exam suggestive of atelectasis or scarring. There is a small infiltrate in the right

middle lobe likely representing atelectasis. The lungs are otherwise clear. No pleural

effusion is seen.



No significant focal osseous abnormality is seen.



The results of this exam were called to referring clinician.



IMPRESSION:  

1. SOLITARY LEFT LOWER LOBE BASILAR SEGMENTAL ARTERY PULMONARY EMBOLUS.

2. MODERATE SIZE CENTRAL HERNIA AND THICKENING OF THE WALL OF THE MID AND DISTAL ESOPHAGUS

NONSPECIFIC ALTHOUGH SUGGESTIVE OF ESOPHAGITIS. RECOMMEND CLINICAL CORRELATION.

## 2018-04-18 NOTE — RAD
INDICATION:  Chest pain.



COMPARISON:  Comparison is made with a prior study from September 26, 2017.



TECHNIQUE: A portable view of the chest was obtained.



FINDINGS: Cardiac and mediastinal contours appear to be within normal limits.



The lungs are clear. No pleural effusion is seen.



IMPRESSION:  NO EVIDENCE FOR ACUTE DISEASE.

## 2018-04-19 VITALS — SYSTOLIC BLOOD PRESSURE: 115 MMHG | DIASTOLIC BLOOD PRESSURE: 68 MMHG

## 2018-04-19 NOTE — PN
Subjective





- Subjective


Reason for Note: Progress Note


History: 





Discharge Summary





I obtained her presentation from the patient and Dr. Marjorie Espinoza's admitting 

history and physical.  She has had 2 weeks of rapid heart beat, she has 

attributed this to taking Namenda.  She had 2 days of shortness of breath.  She 

has her usual chest pains from fibromyalgia.  CTA showed small left lower lobe 

segmental artery with a PE.  


This morning she has no chest pain, she has normal O2 sats on room air. She has 

had some symptoms of rapid heart rate overnight, but nothing on telemetry. She 

is not dyspneic. 


Active Problems: 


 Active Problems





Pulmonary embolism (Acute) I26.99


Swollen leg (Acute) M79.89


Tachycardia (Acute) R00.0








Current Medications: 


 Current Medications





Acetaminophen (Tylenol Tab*)  650 mg PO Q4H PRN


   PRN Reason: FEVER/PAIN


   Last Admin: 18 04:45 Dose:  650 mg


Al Hydrox/Mg Hydrox/Simethicone (Maalox Plus*)  30 ml PO Q6H PRN


   PRN Reason: INDIGESTION


Alprazolam (Xanax Tab*)  0.5 mg PO TID PRN


   PRN Reason: ANXIETY


Bupropion HCl (Wellbutrin Sr Tab*)  150 mg PO BID [facility]


Cholecalciferol (Vitamin D Tab*)  1,000 units PO DAILY [facility]


Docusate Sodium (Colace Cap*)  100 mg PO BID PRN


   PRN Reason: CONSTIPATION


Enoxaparin Sodium (Lovenox(*))  100 mg SUBCUT Q12H [facility]


   Last Admin: 18 00:00 Dose:  100 mg


Famotidine (Pepcid Tab*)  20 mg PO BID [facility]


Sodium Chloride (Ns 0.9% 1000 Ml*)  1,000 mls @ 150 mls/hr IV PER RATE [facility]


   Last Admin: 18 23:59 Dose:  150 mls/hr


Levothyroxine Sodium (Synthroid Tab*)  150 mcg PO 0600 [facility]


   Last Admin: 18 05:40 Dose:  150 mcg


Magnesium Citrate (Citrate Of Magnesia*)  30 ml PO QID PRN


   PRN Reason: CONSTIPATION


Memantine (Namenda Xr Cap*)  28 mg PO DAILY [facility]


Multivitamins/Minerals (Theragran/Minerals Tab*)  1 tab PO DAILY [facility]


Ondansetron HCl (Zofran Inj*)  4 mg IV Q4H PRN


   PRN Reason: NAUSEA/VOMITING


Senna (Senokot Tab*)  1 tab PO BID PRN


   PRN Reason: CONSTIPATION


Tramadol HCl (Ultram*)  100 mg PO DAILY PRN


   PRN Reason: PAIN








Home Medications: 


 Home Medications











 Medication  Instructions  Recorded  Confirmed  Type


 


Levothyroxine TAB* [Synthroid 100 150 mcg PO DAILY 14 History





MCG TAB*]    


 


Magnesium Citrate 30 ml PO QID 17 History


 


Acetaminophen TAB* [Tylenol TAB*] 650 mg PO Q6H PRN #30 tab 17 Rx


 


ALPRAZolam TAB* [Xanax TAB*] 0.5 mg PO TID PRN 18 History


 


Cholecalciferol TAB* [Vitamin D 1,000 unit PO DAILY 18 History





TAB*]    


 


Famotidine TAB* [Pepcid 20 MG TAB*] 20 mg PO BID 18 History


 


Memantine XR CAP* [Namenda XR CAP*] 28 mg PO DAILY 18 History


 


Multivitamins/Minerals TAB* 1 tab PO DAILY 18 History





[Theragran/minerals TAB*]    


 


buPROPion SR TAB* [Wellbutrin  mg PO BID 18 History





TAB*]    


 


traMADol TAB* [Ultram*] 100 mg PO DAILY 18 History











Allergies: 


 Allergies











Allergy/AdvReac Type Severity Reaction Status Date / Time


 


adhesive tape Allergy  Rash Verified 18 18:13


 


Gadolinium-Containing Allergy  Unknown Verified 18 18:13





Contrast Medi   Reaction  





   Details  


 


Sulfa (Sulfonamide Allergy  Hives Verified 18 18:13





Antibiotics)     














Objective





- Vital Signs


Vital Signs: 


 Vital Signs











  18





  23:24 03:27


 


Temperature 98.5 F 98.2 F


 


Pulse Rate 51 80


 


Respiratory 20 16





Rate  


 


Blood Pressure 138/57 119/65





(mmHg)  


 


O2 Sat by Pulse 98 99





Oximetry  














- Intake and Output


Intake and Output: 


 Intake & Output











 18/18





 11:59 11:59 11:59 11:59


 


Intake Total    1605


 


Output Total    0


 


Balance    1605


 


Weight    222 lb 12.8 oz


 


Intake:    


 


  IV Fluids    1055


 


  Oral    550


 


Output:    


 


  Urine    0


 


Other:    


 


  # Bowel Movements    0


 


  # Voids    0





 





ADLs: Meal  Record                                         Start:  18 23:

24


Freq:   DAILY@0900,1400,1800                               Status: Active      

  


Protocol:                                                                      

  


 Created      18 23:24  System  (Rec: 18 23:24  System  [id number])


Intake and Output                                          Start:  18 23:

24


Freq:   DAILY@0600,1400,2200                               Status: Active      

  


Protocol:                                                                      

  


 Created      18 23:24  System  (Rec: 18 23:24  System  [id number])


 Document     18 05:50  [id number]  (Rec: 18 05:50  [id number]  [id number])











- Physical Exam


General: No Cyanosis, No Anemia, No Jaundice, No Lymphadenopathy, No Clubbing


Skin: Normal: Rash


Lungs and Chest: Yes: Chest Expansion Full, Chest Expansion Symetrica, 

Percussion Note Resonant, Vessicular Breath Sounds.  No: Crackles, Wheezes


Heart Rate and Rhythm: Regular


JVP: Not Elevated


Additional Cardiovascular: Yes: Normal Heart Sounds.  No: Heart Murmur, Carotid 

Bruits, Pedal Edema


Abdominal Exam: Yes: Soft.  No: Distention, Abdominal Mass, Abdominal Tenderness

, Bowel Sounds Present





- Extremities


Cranial Nerves II-XII Intact: Yes


Limbs: Normal Power, Normal Tone





- Neuro


Orientation: A/O x3


Speech: Normal





Results





- Results


Lab Results: 


 Laboratory Results - last 24 hr











  18





  23:07


 


Troponin I  0.00











Radiology Results: 





Patient Name:         VICTORIA BUI                                           

                        Medical Record#: [id number]


Ordering Physician: Trent Laguna MD                                           

                        Acct.#: [id number]


:     1955         Age: 62   Sex: F                                   

                        Location: EMERGENCY DEPARTMENT


Exam Date: 18                                                       

                        ADM Status: REG ER


Order Information:                         CTA CHEST


Accession Number:                          [id number]


CPT:                                       10350


INDICATION:  Chest pain.





COMPARISON: Comparison is made with a prior CT of the chest from 2018.


Correlation is also made with a prior chest x-ray study from 2018.





TECHNIQUE: A CT angiogram of the chest was performed with intravenous following


intravenous injection of 83 ml of Omnipaque 350 nonionic contrast. Contiguous 

axial


sections were obtained from the lung apices through the lung bases. Images were


reconstructed in the coronal and sagittal planes.





FINDINGS: There is an intraluminal filling defect in a solitary left lower lobe 

basilar


segmental artery most consistent with a pulmonary embolus. No other emboli are 

seen.





The heart is within normal limits in size. No pericardial effusion is present.  

The


thoracic aorta is normal in caliber and demonstrates homogeneous contrast 

opacification.





No significant enlarged mediastinal or hilar lymph nodes are seen.





There is a moderate size hiatal hernia. There is nonspecific circumferential 

thickening of


the wall of the mid and distal esophagus suggestive of esophagitis. 





There is a small linear density in the right upper lobe which is unchanged from 

the prior


exam suggestive of atelectasis or scarring. There is a small infiltrate in the 

right


middle lobe likely representing atelectasis. The lungs are otherwise clear. No 

pleural


effusion is seen.





No significant focal osseous abnormality is seen.





The results of this exam were called to referring clinician.





IMPRESSION:  


1. SOLITARY LEFT LOWER LOBE BASILAR SEGMENTAL ARTERY PULMONARY EMBOLUS.


2. MODERATE SIZE CENTRAL HERNIA AND THICKENING OF THE WALL OF THE MID AND 

DISTAL ESOPHAGUS


NONSPECIFIC ALTHOUGH SUGGESTIVE OF ESOPHAGITIS. RECOMMEND CLINICAL CORRELATION.





____________________________________________________________


<Electronically signed by Clint Cox MD in OV>  18


Dictated By: Clint Cox MD


Dictated Date/Time: 18


Transcribed Date/Time: 18


Copy to:











CC:Serjio Chaudhry MD; Trent Laguna MD


Imaging - [facility]                                 Imaging - [facility]                                     [facility] 


[address]                                       [address]


                                                                    1 of 2








Assessment





- Problem List


Assessment: 


Patient Problems





Pulmonary embolism (Acute)


Swollen leg (Acute)


Tachycardia (Acute)


Anxiety disorder (Chronic 14)


Depression (Chronic 14)


Fibromyalgia (Chronic 14)


Gastroesophageal reflux disease (Chronic 14)


Obesity (Chronic 14)


Osteoarthritis (Chronic 14)


Primary hypothyroidism (Chronic 14)


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


Tobacco abuse (Chronic 14)








Plan: 





Pulmonary embolism (Acute)  This is a small PE.  I am awaiting her doppler 

report.  She is not compromised hemodynamically. I am starting her on xarelto 

15 mg bid for 3 weeks and will then change her to 20 mg qdaily.  I will discuss 

with oncology if this requires longterm therapy


Swollen leg (Acute)  This is not apparent on clinical examination


Small cell carcinoma of right lung (Chronic)  She is managed by Dr. Neumann - 

this is under control at present following radiotherapy/chemotherapy


Tachycardia (Acute)  She states she had the sensation of a rapid heart rate 

overnight, but she has had normal sinus rhythm on telemetry without 

tachycardia.  She attributes this to Namenda - I have told her to stop this.


Anxiety disorder (Chronic 14)  Depression (Chronic 14)  ongoing


Fibromyalgia (Chronic 14)  ongoing


Gastroesophageal reflux disease (Chronic 14)  ongoing


Obesity (Chronic 14) ongoing


Osteoarthritis (Chronic 14)  secondary diagnosis


Primary hypothyroidism (Chronic 14) secondary diagnosis


Tobacco abuse (Chronic 14) states she is not smoking.





I discussed the above with the patient. I explained we will start her on 

xarelto. I discussed the advantages (the lung cancer and its treatmenthas 

likely increased her chances of a PE and hence we need to protect her from a 

much more hemodynamically significant PE).  Disadvantages:  chance of 

hemorrhage.  She has no reason to stay in an acute hospital bed. I will 

discharge her home and she will see me as an outpatient in a few days

## 2018-04-19 NOTE — RAD
HISTORY: Pulmonary embolism, rule out DVT



COMPARISONS: December 19, 2014



TECHNIQUE: Multiple transverse and longitudinal ultrasound images were obtained of the

bilateral lower extremities  from the level of the common femoral vein inferiorly through

to the infrapopliteal veins  using grayscale, color Doppler, and spectral Doppler imaging

with and without compression and with augmentation. Comparison images were obtained of the

contralateral common femoral vein.



FINDINGS:

VEINS: The venous system of the bilateral lower extremities  is compressible throughout

its course, with normal flow on color Doppler imaging and normal response to augmentation

on spectral Doppler imaging. The greater saphenous veins are not well visualized as noted

on the previous examination.



SOFT TISSUES: Unremarkable.



OTHER FINDINGS: None.



IMPRESSION: 

1.  NO RIGHT LOWER EXTREMITY DEEP VEIN THROMBOSIS.

2.  NO LEFT LOWER EXTREMITY DEEP VEIN THROMBOSIS

## 2018-07-23 ENCOUNTER — HOSPITAL ENCOUNTER (EMERGENCY)
Dept: HOSPITAL 25 - ED | Age: 63
Discharge: HOME | End: 2018-07-23
Payer: COMMERCIAL

## 2018-07-23 VITALS — SYSTOLIC BLOOD PRESSURE: 123 MMHG | DIASTOLIC BLOOD PRESSURE: 97 MMHG

## 2018-07-23 DIAGNOSIS — J44.9: ICD-10-CM

## 2018-07-23 DIAGNOSIS — N39.0: ICD-10-CM

## 2018-07-23 DIAGNOSIS — Z88.2: ICD-10-CM

## 2018-07-23 DIAGNOSIS — Z87.891: ICD-10-CM

## 2018-07-23 DIAGNOSIS — J18.9: Primary | ICD-10-CM

## 2018-07-23 DIAGNOSIS — Z85.118: ICD-10-CM

## 2018-07-23 DIAGNOSIS — Z91.041: ICD-10-CM

## 2018-07-23 LAB
BASOPHILS # BLD AUTO: 0 10^3/UL (ref 0–0.2)
EOSINOPHIL # BLD AUTO: 0.1 10^3/UL (ref 0–0.6)
HCT VFR BLD AUTO: 39 % (ref 35–47)
HGB BLD-MCNC: 13.1 G/DL (ref 12–16)
LYMPHOCYTES # BLD AUTO: 1.1 10^3/UL (ref 1–4.8)
MCH RBC QN AUTO: 31 PG (ref 27–31)
MCHC RBC AUTO-ENTMCNC: 34 G/DL (ref 31–36)
MCV RBC AUTO: 93 FL (ref 80–97)
MONOCYTES # BLD AUTO: 0.6 10^3/UL (ref 0–0.8)
NEUTROPHILS # BLD AUTO: 3.7 10^3/UL (ref 1.5–7.7)
NRBC # BLD AUTO: 0 10^3/UL
NRBC BLD QL AUTO: 0
PLATELET # BLD AUTO: 222 10^3/UL (ref 150–450)
RBC # BLD AUTO: 4.19 10^6/UL (ref 4–5.4)
WBC # BLD AUTO: 5.6 10^3/UL (ref 3.5–10.8)
WBC UR QL AUTO: (no result)

## 2018-07-23 PROCEDURE — 83605 ASSAY OF LACTIC ACID: CPT

## 2018-07-23 PROCEDURE — 80053 COMPREHEN METABOLIC PANEL: CPT

## 2018-07-23 PROCEDURE — 83690 ASSAY OF LIPASE: CPT

## 2018-07-23 PROCEDURE — 71046 X-RAY EXAM CHEST 2 VIEWS: CPT

## 2018-07-23 PROCEDURE — 86140 C-REACTIVE PROTEIN: CPT

## 2018-07-23 PROCEDURE — 85025 COMPLETE CBC W/AUTO DIFF WBC: CPT

## 2018-07-23 PROCEDURE — 99281 EMR DPT VST MAYX REQ PHY/QHP: CPT

## 2018-07-23 PROCEDURE — 87086 URINE CULTURE/COLONY COUNT: CPT

## 2018-07-23 PROCEDURE — 36415 COLL VENOUS BLD VENIPUNCTURE: CPT

## 2018-07-23 PROCEDURE — 81003 URINALYSIS AUTO W/O SCOPE: CPT

## 2018-07-23 PROCEDURE — 81015 MICROSCOPIC EXAM OF URINE: CPT

## 2018-07-23 NOTE — ED
Respiratory





- HPI Summary


HPI Summary: 


62-year-old female presents with cough for the past 2 days.  She states she did 

have a history of lung cancer but has been in remission since last year.  

States she quit smoking last year.  No history asthma or COPD.  She states 

occasional shortness breath.  She admits to wheezing.  She denies any chest 

pain.  she admits to occasional generalized headache.  She also admits to 

suprapubic abdominal pain.  She admits to nausea but denies any vomiting.  She 

denies any diarrhea constipation.  She admits pain with urination.  She states 

concerned she has a UTI.








- History of Current Complaint


Chief Complaint: EDGeneral


Stated Complaint: GENERAL ILLNESS


Time Seen by Provider: 07/23/18 18:41


Pain Intensity: 4


Sputum Amount: None





- Allergy/Home Medications


Allergies/Adverse Reactions: 


 Allergies











Allergy/AdvReac Type Severity Reaction Status Date / Time


 


adhesive tape Allergy  Rash Verified 05/31/18 14:04


 


Gadolinium-Containing Allergy  Unknown Verified 05/31/18 14:04





Contrast Medi   Reaction  





   Details  


 


Sulfa (Sulfonamide Allergy  Hives Verified 05/31/18 14:04





Antibiotics)     











Home Medications: 


 Home Medications





Aspirin EC TAB* [Ecotrin EC Low Dose 81 MG*] 81 mg PO DAILY 07/23/18 [History 

Confirmed 07/23/18]


DULoxetine DR CAP* [Cymbalta CAP*] 60 mg PO QAM 07/23/18 [History Confirmed 07/ 23/18]


Memantine TAB* [Namenda TAB*] 5 mg PO BID 07/23/18 [History Confirmed 07/23/18]











PMH/Surg Hx/FS Hx/Imm Hx


Endocrine/Hematology History: Reports: Hx Thyroid Disease - HYPOTHYROID


   Denies: Hx Diabetes, Hx Anemia


Cardiovascular History: Reports: Hx Valvular Heart Disease - MV prolapse, Other 

Cardiovascular Problems/Disorders - VALVULAR HEART DISEASE(MV PROLAPSE)


   Denies: Hx Congestive Heart Failure, Hx Hypertension, Hx Pacemaker/ICD


Respiratory History: Reports: Hx Chronic Obstructive Pulmonary Disease (COPD), 

Other Respiratory Problems/Disorders - LUNG CA, PT.STATES?LT.LUNG, JUST DX'D 

EARLIER THIS YEAR


   Denies: Hx Asthma


GI History: Reports: Hx Gastroesophageal Reflux Disease, Hx Hiatal Hernia, 

Other GI Disorders - GERD, ? IBS


   Denies: Hx Jaundice


 History: 


   Denies: Hx Dialysis, Hx Renal Disease


Musculoskeletal History: Reports: Hx Arthritis - osteoarthritis in lower back, 

Hx Orthopedic Injury


Sensory History: Reports: Hx Contacts or Glasses


   Denies: Hx Hearing Aid


Opthamlomology History: Reports: Hx Contacts or Glasses


Neurological History: Reports: Hx Nerve Disease - fibromyalgia, Other Neuro 

Impairments/Disorders - L5-S1 PAIN INJECTION


Psychiatric History: Reports: Hx Anxiety, Hx Depression


   Denies: Hx Panic Disorder





- Cancer History


Cancer Type, Location and Year: LUNG CA


Hx Chemotherapy: No


Hx Radiation Therapy: No





- Surgical History


Surgery Procedure, Year, and Place: SEVERAL FOOT SURGERIES-([facility]) [facility].  MENISCUS 

REPAIR- Lt KNEE -.  Partial thyroidectomy (cyst).  Rt HAND- REMOVED A PIECE OF 

BONE


Hx Anesthesia Reactions: No





- Immunization History


Date of Tetanus Vaccine: unk


Date of Influenza Vaccine: unk


Infectious Disease History: No


Infectious Disease History: 


   Denies: Traveled Outside the [place] in Last 30 Days





- Family History


Known Family History: Positive: Renal Disease, Other - arthritis, CA





- Social History


Alcohol Use: None


Alcohol Amount: 2-3 beers a couple times per week- pt seems evasive


Hx Substance Use: No


Substance Use Type: Reports: None


Substance Use Comment - Amount & Last Used: Tramadol ER


Hx Tobacco Use: Yes


Smoking Status (MU): Former Smoker


Type: Cigarettes


Amount Used/How Often: 1 ppd


Have You Smoked in the Last Year: Yes





Review of Systems


Negative: Fever


Negative: Chest Pain


Positive: Shortness Of Breath, Cough


Positive: Abdominal Pain, Nausea.  Negative: Vomiting, Diarrhea


Positive: dysuria


All Other Systems Reviewed And Are Negative: Yes





Physical Exam


Triage Information Reviewed: Yes


Vital Signs On Initial Exam: 


 Initial Vitals











Temp Pulse Resp BP Pulse Ox


 


 98.5 F   88   18   115/64   95 


 


 07/23/18 15:35  07/23/18 15:35  07/23/18 15:35  07/23/18 15:35  07/23/18 15:35











Vital Signs Reviewed: Yes


Appearance: Positive: Well-Appearing


Skin: Positive: Warm, Dry


Head/Face: Positive: Normal Head/Face Inspection


Eyes: Positive: Normal, EOMI, ROSAMARIA, Conjunctiva Clear


ENT: Positive: Normal ENT inspection, Pharyngeal erythema, TMs normal, Uvula 

midline, Other - soft palate symmetric.  Negative: Tonsillar swelling, 

Tonsillar exudate, Trismus, Muffled voice


Respiratory/Lung Sounds: Positive: Decreased Breath Sounds, Wheezes


Cardiovascular: Positive: Normal, RRR


Abdomen Description: Positive: Nontender, Soft


Bowel Sounds: Positive: Present


Musculoskeletal: Positive: Normal


Neurological: Positive: Normal


Psychiatric: Positive: Normal





Diagnostics





- Vital Signs


 Vital Signs











  Temp Pulse Resp BP Pulse Ox


 


 07/23/18 19:15   62  20   99


 


 07/23/18 17:29  97.8 F  50  18  124/68  98


 


 07/23/18 15:35  98.5 F  88  18  115/64  95














- Laboratory


Lab Results: 


 Lab Results











  07/23/18 07/23/18 07/23/18 Range/Units





  17:37 17:37 17:37 


 


WBC  5.6    (3.5-10.8)  10^3/ul


 


RBC  4.19    (4.00-5.40)  10^6/ul


 


Hgb  13.1    (12.0-16.0)  g/dl


 


Hct  39    (35-47)  %


 


MCV  93    (80-97)  fL


 


MCH  31    (27-31)  pg


 


MCHC  34    (31-36)  g/dl


 


RDW  13    (10.5-15)  %


 


Plt Count  222    (150-450)  10^3/ul


 


MPV  7.0 L    (7.4-10.4)  um3


 


Neut % (Auto)  66.4    (38-83)  %


 


Lymph % (Auto)  20.5 L    (25-47)  %


 


Mono % (Auto)  10.5 H    (0-7)  %


 


Eos % (Auto)  2.2    (0-6)  %


 


Baso % (Auto)  0.4    (0-2)  %


 


Absolute Neuts (auto)  3.7    (1.5-7.7)  10^3/ul


 


Absolute Lymphs (auto)  1.1    (1.0-4.8)  10^3/ul


 


Absolute Monos (auto)  0.6    (0-0.8)  10^3/ul


 


Absolute Eos (auto)  0.1    (0-0.6)  10^3/ul


 


Absolute Basos (auto)  0    (0-0.2)  10^3/ul


 


Absolute Nucleated RBC  0    10^3/ul


 


Nucleated RBC %  0    


 


Sodium   139   (135-145)  mmol/L


 


Potassium   4.0   (3.5-5.0)  mmol/L


 


Chloride   101   (101-111)  mmol/L


 


Carbon Dioxide   31   (22-32)  mmol/L


 


Anion Gap   7   (2-11)  mmol/L


 


BUN   15   (6-24)  mg/dL


 


Creatinine   0.81   (0.51-0.95)  mg/dL


 


Est GFR ( Amer)   87.0   (>60)  


 


Est GFR (Non-Af Amer)   71.9   (>60)  


 


BUN/Creatinine Ratio   18.5   (8-20)  


 


Glucose   95   ()  mg/dL


 


Lactic Acid    0.8  (0.5-2.0)  mmol/L


 


Calcium   9.0   (8.6-10.3)  mg/dL


 


Total Bilirubin   0.30   (0.2-1.0)  mg/dL


 


AST   12 L   (13-39)  U/L


 


ALT   12   (7-52)  U/L


 


Alkaline Phosphatase   90   ()  U/L


 


C-Reactive Protein   24.19 H   (<8.01)  mg/L


 


Total Protein   7.0   (6.4-8.9)  g/dL


 


Albumin   4.1   (3.2-5.2)  g/dL


 


Globulin   2.9   (2-4)  g/dL


 


Albumin/Globulin Ratio   1.4   (1-3)  


 


Lipase   14   (11.0-82.0)  U/L


 


Urine Color     


 


Urine Appearance     


 


Urine pH     (5-9)  


 


Ur Specific Gravity     (1.010-1.030)  


 


Urine Protein     (Negative)  


 


Urine Ketones     (Negative)  


 


Urine Blood     (Negative)  


 


Urine Nitrate     (Negative)  


 


Urine Bilirubin     (Negative)  


 


Urine Urobilinogen     (Negative)  


 


Ur Leukocyte Esterase     (Negative)  


 


Urine WBC (Auto)     (Absent)  


 


Urine RBC (Auto)     (Absent)  


 


Ur Squamous Epith Cells     (Absent)  


 


Urine Bacteria     (Absent)  


 


Urine Glucose     (Negative)  


 


Urine Ascorbic Acid     (Negative)  














  07/23/18 Range/Units





  18:51 


 


WBC   (3.5-10.8)  10^3/ul


 


RBC   (4.00-5.40)  10^6/ul


 


Hgb   (12.0-16.0)  g/dl


 


Hct   (35-47)  %


 


MCV   (80-97)  fL


 


MCH   (27-31)  pg


 


MCHC   (31-36)  g/dl


 


RDW   (10.5-15)  %


 


Plt Count   (150-450)  10^3/ul


 


MPV   (7.4-10.4)  um3


 


Neut % (Auto)   (38-83)  %


 


Lymph % (Auto)   (25-47)  %


 


Mono % (Auto)   (0-7)  %


 


Eos % (Auto)   (0-6)  %


 


Baso % (Auto)   (0-2)  %


 


Absolute Neuts (auto)   (1.5-7.7)  10^3/ul


 


Absolute Lymphs (auto)   (1.0-4.8)  10^3/ul


 


Absolute Monos (auto)   (0-0.8)  10^3/ul


 


Absolute Eos (auto)   (0-0.6)  10^3/ul


 


Absolute Basos (auto)   (0-0.2)  10^3/ul


 


Absolute Nucleated RBC   10^3/ul


 


Nucleated RBC %   


 


Sodium   (135-145)  mmol/L


 


Potassium   (3.5-5.0)  mmol/L


 


Chloride   (101-111)  mmol/L


 


Carbon Dioxide   (22-32)  mmol/L


 


Anion Gap   (2-11)  mmol/L


 


BUN   (6-24)  mg/dL


 


Creatinine   (0.51-0.95)  mg/dL


 


Est GFR ( Amer)   (>60)  


 


Est GFR (Non-Af Amer)   (>60)  


 


BUN/Creatinine Ratio   (8-20)  


 


Glucose   ()  mg/dL


 


Lactic Acid   (0.5-2.0)  mmol/L


 


Calcium   (8.6-10.3)  mg/dL


 


Total Bilirubin   (0.2-1.0)  mg/dL


 


AST   (13-39)  U/L


 


ALT   (7-52)  U/L


 


Alkaline Phosphatase   ()  U/L


 


C-Reactive Protein   (<8.01)  mg/L


 


Total Protein   (6.4-8.9)  g/dL


 


Albumin   (3.2-5.2)  g/dL


 


Globulin   (2-4)  g/dL


 


Albumin/Globulin Ratio   (1-3)  


 


Lipase   (11.0-82.0)  U/L


 


Urine Color  Yellow  


 


Urine Appearance  Cloudy  


 


Urine pH  5.0  (5-9)  


 


Ur Specific Gravity  1.025  (1.010-1.030)  


 


Urine Protein  Negative  (Negative)  


 


Urine Ketones  Trace A  (Negative)  


 


Urine Blood  Negative  (Negative)  


 


Urine Nitrate  Negative  (Negative)  


 


Urine Bilirubin  Negative  (Negative)  


 


Urine Urobilinogen  Negative  (Negative)  


 


Ur Leukocyte Esterase  1+ A  (Negative)  


 


Urine WBC (Auto)  1+(6-10/hpf) A  (Absent)  


 


Urine RBC (Auto)  Absent  (Absent)  


 


Ur Squamous Epith Cells  Present A  (Absent)  


 


Urine Bacteria  Absent  (Absent)  


 


Urine Glucose  Negative  (Negative)  


 


Urine Ascorbic Acid  * A  (Negative)  











Result Diagrams: 


 07/23/18 17:37





 07/23/18 17:37


Lab Statement: Any lab studies that have been ordered have been reviewed, and 

results considered in the medical decision making process.





- Radiology


  ** chest


Xray Interpretation: Positive (See Comments) - IMPRESSION: 1. SMALL RIGHT 

BASILAR INFILTRATE. 2. COPD.


Radiology Interpretation Completed By: Radiologist





Re-Evaluation





- Re-Evaluation


  ** First Eval


Re-Evaluation Time: 20:09


Change: Improved


Comment: feeling better after neb treatment





Disposition





- Course


Course Of Treatment: 62-year-old female presents with cough for the past 2 

days.  She states she did have a history of lung cancer but has been in 

remission since last year.  States she quit smoking last year.  No history 

asthma or COPD.  She states occasional shortness breath.  She admits to 

wheezing.  She denies any chest pain.  she admits to occasional generalized 

headache.  She also admits to suprapubic abdominal pain.  She admits to nausea 

but denies any vomiting.  She denies any diarrhea constipation.  She admits 

pain with urination.  She states concerned she has a UTI.  On exam decreased 

breath sounds and wheezing and lungs.  Abdomen soft nontender.  Chest x-ray 

shows infiltrate basilar.  Labs white blood count normal.  electrolytnes 

normal.  Urine shows a UTI.  Will place on steroid inhaler azithromycin and 

macrobid For UTI.  Told to follow with primary.  Patient understands agrees 

with plan.





- Differential Dx - Cardiopulmonary


Differential Diagnoses - Cardiopulmonary: Bronchitis, Exacerbation Of COPD, 

Lower Resp Infection





- Diagnoses


Provider Diagnoses: 


 Pneumonia, UTI (urinary tract infection)








Discharge





- Sign-Out/Discharge


Documenting (check all that apply): Patient Departure





- Discharge Plan


Condition: Good


Disposition: HOME


Prescriptions: 


Albuterol HFA INHALER* [Ventolin HFA Inhaler*] 1 puff INH Q6H PRN #1 mdi


 PRN Reason: Cough


Azithromycin TAB* [Zithromax TAB (Z-INOCENCIA) 250 mg #6 tabs] 250 mg PO DAILY #4 tab


Nitrofurantoin Monohyd/M-Cryst [Macrobid 100 mg Capsule] 100 mg PO BID #13 cap


predniSONE TAB* [Deltasone TAB*] 50 mg PO DAILY #4 tab


Patient Education Materials:  Urinary Tract Infection in Women (ED), Pneumonia (

ED)


Referrals: 


Serjio Chaudhry MD [Primary Care Provider] - 


Additional Instructions: 


Use inhaler up to two puffs every 4 hours for cough and wheezing


Take steroid once a day for 4 more days starting tomorrow 


Take azithromycin once daily starting tomorrow for 4 days


take macrobid twice a day for 7 days


Take Tylenol  for pain every 6 hours


Return to ED if develop severe shortness of breath, worsening chest pain, or 

any new or worsening symptoms





- Billing Disposition and Condition


Condition: GOOD


Disposition: Home

## 2018-07-23 NOTE — RAD
INDICATION:  Cough.



COMPARISON:  Comparison is made with prior chest x-ray study from April 18, 2018.



TECHNIQUE: Dual-energy PA  and lateral views of the chest were obtained.



FINDINGS:   The heart is within normal limits in size. Mediastinal and hilar contours

appear within normal limits.



The lungs are hyperinflated. There is a small infiltrate at the right lung base. No

pleural effusion is seen. 



IMPRESSION:  

1. SMALL RIGHT BASILAR INFILTRATE.

2. COPD.

## 2018-10-27 ENCOUNTER — HOSPITAL ENCOUNTER (EMERGENCY)
Dept: HOSPITAL 25 - ED | Age: 63
Discharge: HOME | End: 2018-10-27
Payer: COMMERCIAL

## 2018-10-27 VITALS — DIASTOLIC BLOOD PRESSURE: 88 MMHG | SYSTOLIC BLOOD PRESSURE: 144 MMHG

## 2018-10-27 DIAGNOSIS — R10.84: ICD-10-CM

## 2018-10-27 DIAGNOSIS — Z87.891: ICD-10-CM

## 2018-10-27 DIAGNOSIS — Z88.2: ICD-10-CM

## 2018-10-27 DIAGNOSIS — Z91.048: ICD-10-CM

## 2018-10-27 DIAGNOSIS — M43.17: ICD-10-CM

## 2018-10-27 DIAGNOSIS — R11.0: ICD-10-CM

## 2018-10-27 DIAGNOSIS — Z91.041: ICD-10-CM

## 2018-10-27 DIAGNOSIS — K59.00: Primary | ICD-10-CM

## 2018-10-27 DIAGNOSIS — K46.9: ICD-10-CM

## 2018-10-27 LAB
BASOPHILS # BLD AUTO: 0 10^3/UL (ref 0–0.2)
EOSINOPHIL # BLD AUTO: 0.1 10^3/UL (ref 0–0.6)
HCT VFR BLD AUTO: 37 % (ref 35–47)
HGB BLD-MCNC: 12.5 G/DL (ref 12–16)
INR PPP/BLD: 0.89 (ref 0.77–1.02)
LYMPHOCYTES # BLD AUTO: 1.4 10^3/UL (ref 1–4.8)
MCH RBC QN AUTO: 31 PG (ref 27–31)
MCHC RBC AUTO-ENTMCNC: 34 G/DL (ref 31–36)
MCV RBC AUTO: 91 FL (ref 80–97)
MONOCYTES # BLD AUTO: 0.4 10^3/UL (ref 0–0.8)
NEUTROPHILS # BLD AUTO: 2.8 10^3/UL (ref 1.5–7.7)
NRBC # BLD AUTO: 0 10^3/UL
NRBC BLD QL AUTO: 0.1
PLATELET # BLD AUTO: 250 10^3/UL (ref 150–450)
RBC # BLD AUTO: 4.04 10^6/UL (ref 4–5.4)
WBC # BLD AUTO: 4.7 10^3/UL (ref 3.5–10.8)

## 2018-10-27 PROCEDURE — 74176 CT ABD & PELVIS W/O CONTRAST: CPT

## 2018-10-27 PROCEDURE — 99283 EMERGENCY DEPT VISIT LOW MDM: CPT

## 2018-10-27 PROCEDURE — 96375 TX/PRO/DX INJ NEW DRUG ADDON: CPT

## 2018-10-27 PROCEDURE — 83880 ASSAY OF NATRIURETIC PEPTIDE: CPT

## 2018-10-27 PROCEDURE — 36415 COLL VENOUS BLD VENIPUNCTURE: CPT

## 2018-10-27 PROCEDURE — 83605 ASSAY OF LACTIC ACID: CPT

## 2018-10-27 PROCEDURE — 85730 THROMBOPLASTIN TIME PARTIAL: CPT

## 2018-10-27 PROCEDURE — 83690 ASSAY OF LIPASE: CPT

## 2018-10-27 PROCEDURE — 80053 COMPREHEN METABOLIC PANEL: CPT

## 2018-10-27 PROCEDURE — 85610 PROTHROMBIN TIME: CPT

## 2018-10-27 PROCEDURE — 86140 C-REACTIVE PROTEIN: CPT

## 2018-10-27 PROCEDURE — 85025 COMPLETE CBC W/AUTO DIFF WBC: CPT

## 2018-10-27 PROCEDURE — 96374 THER/PROPH/DIAG INJ IV PUSH: CPT

## 2018-10-27 RX ADMIN — SODIUM CHLORIDE ONE MLS/HR: 900 IRRIGANT IRRIGATION at 14:14

## 2018-10-27 NOTE — ED
Abdominal Pain/Female





- HPI Summary


HPI Summary: 


The pt is a 63 y/[facility] since 

3 days ago worsened today.  She notes constipation, back pain, dehydration, HA, 

nausea, chronic LE pain but denies fever, chills, and dysuria.The pain rated 10/

10 in severity is described as twisting. She used MiraLAX and suppositories 

for the constipation to no relief. 





- History of Current Complaint


Chief Complaint: EDAbdPain


Stated Complaint: ABD/BACK PAIN


Time Seen by Provider: 10/27/18 13:11


Hx Obtained From: Patient


Pregnant?: No


Onset/Duration: Lasting Days - 3 days, Still Present, Worse Since - Today 

morning


Timing: Constant


Severity Initially: Severe


Severity Currently: Severe


Pain Intensity: 10


Pain Scale Used: 0-10 Numeric


Location: Diffuse


Radiates: Yes


Radiates to: Back


Character: Other: - "Twisting"


Associated Signs and Symptoms: Positive: Back Pain, Constipation, Nausea.  

Negative: Fever, Urinary Symptoms


Allergies/Adverse Reactions: 


 Allergies











Allergy/AdvReac Type Severity Reaction Status Date / Time


 


adhesive tape Allergy  Rash Verified 10/27/18 12:21


 


Gadolinium-Containing Allergy  Unknown Verified 10/27/18 12:21





Contrast Medi   Reaction  





   Details  


 


Sulfa (Sulfonamide Allergy  Hives Verified 10/27/18 12:21





Antibiotics)     














PMH/Surg Hx/FS Hx/Imm Hx


Previously Healthy: No


Endocrine/Hematology History: Reports: Hx Thyroid Disease - HYPOTHYROID


   Denies: Hx Diabetes, Hx Anemia


Cardiovascular History: Reports: Hx Valvular Heart Disease - MV prolapse, Other 

Cardiovascular Problems/Disorders - VALVULAR HEART DISEASE(MV PROLAPSE)


   Denies: Hx Congestive Heart Failure, Hx Hypertension, Hx Pacemaker/ICD


Respiratory History: Reports: Hx Chronic Obstructive Pulmonary Disease (COPD), 

Other Respiratory Problems/Disorders - LUNG CA, PT.STATES?LT.LUNG, JUST DX'D 

EARLIER THIS YEAR


   Denies: Hx Asthma


GI History: Reports: Hx Gastroesophageal Reflux Disease, Hx Hiatal Hernia, 

Other GI Disorders - GERD, ? IBS


   Denies: Hx Jaundice


 History: 


   Denies: Hx Dialysis, Hx Renal Disease


Musculoskeletal History: Reports: Hx Arthritis - osteoarthritis in lower back, 

Hx Orthopedic Injury


Sensory History: Reports: Hx Contacts or Glasses


   Denies: Hx Hearing Aid


Opthamlomology History: Reports: Hx Contacts or Glasses


Neurological History: Reports: Hx Nerve Disease - fibromyalgia, Other Neuro 

Impairments/Disorders - L5-S1 PAIN INJECTION


Psychiatric History: Reports: Hx Anxiety, Hx Depression


   Denies: Hx Panic Disorder





- Cancer History


Cancer Type, Location and Year: Lung Cancer


Hx Chemotherapy: Yes - Lung Cancer-2017


Hx Radiation Therapy: Yes





- Surgical History


Surgery Procedure, Year, and Place: SEVERAL FOOT SURGERIES-([facility]) [facility].  MENISCUS 

REPAIR- Lt KNEE -.  Partial thyroidectomy (cyst).  Rt HAND- REMOVED A PIECE OF 

BONE


Hx Anesthesia Reactions: No





- Immunization History


Date of Tetanus Vaccine: unk


Date of Influenza Vaccine: unk


Immunizations Up to Date: Yes


Infectious Disease History: No


Infectious Disease History: 


   Denies: Traveled Outside the [place] in Last 30 Days





- Family History


Known Family History: Positive: Renal Disease, Other - arthritis, CA





- Social History


Occupation: Retired


Lives: Alone


Alcohol Use: Rare


Alcohol Amount: 2-3 beers a couple times per week- pt seems evasive


Hx Substance Use: No


Substance Use Type: Reports: None


Substance Use Comment - Amount & Last Used: Tramadol ER


Hx Tobacco Use: Yes


Smoking Status (MU): Former Smoker


Type: Cigarettes


Amount Used/How Often: 1 ppd


Have You Smoked in the Last Year: Yes





Review of Systems


Constitutional: Negative - Positive: Dehydration 


Negative: Fever, Chills


Gastrointestinal: Other - Positive: Constipation 


Positive: Abdominal Pain, Nausea


Negative: dysuria


Musculoskeletal: Other - Positive: Back pain , bilateral LE pain 


Positive: Headache


All Other Systems Reviewed And Are Negative: Yes





Physical Exam





- Summary


Physical Exam Summary: 


General: well-appearing,.mild pain distress


Skin: warm, color reflects adequate perfusion, dry


Head: normal


Eyes: EOMI, ROSAMARIA


ENT: normal


Neck: supple, nontender


Respiratory: CTA, breath sounds present


Cardiovascular: RRR


Abdomen: soft. tender in the periumbilical region


Bowel: Hypoactive bowel sounds


Musculoskeletal: normal, strength/ROM intact


Neurological: sensory/motor intact, A&O x3


Psychological: affect/mood appropriate


Triage Information Reviewed: Yes


Vital Signs On Initial Exam: 


 Initial Vitals











Temp Pulse Resp BP Pulse Ox


 


 98.7 F   98   14   128/70   98 


 


 10/27/18 12:17  10/27/18 12:17  10/27/18 12:17  10/27/18 12:17  10/27/18 12:17











Vital Signs Reviewed: Yes





Diagnostics





- Vital Signs


 Vital Signs











  Temp Pulse Resp BP Pulse Ox


 


 10/27/18 12:17  98.7 F  98  14  128/70  98














- Laboratory


Result Diagrams: 


 10/27/18 13:33





 10/27/18 13:33


Lab Statement: Any lab studies that have been ordered have been reviewed, and 

results considered in the medical decision making process.





- CT


  ** Abd /Pel CT


CT Interpretation Completed By: Radiologist - IMPRESSION:   1. NO EVIDENCE FOR 

ACUTE FINDING. 2. FINDINGS SUGGESTIVE OF PARAMEDIASTINAL FIBROSIS, UNCHANGED. 

3. MODERATE SIZE HIATAL HERNIA, UNCHANGED. 4. SMALL PERIUMBILICAL HERNIA 

CONTAINING FAT, UNCHANGED. 5. GRADE II ANTERIOR SPONDYLOLISTHESIS AT THE L5-S1 

LEVEL, UNCHANGED. The ED physician reviewed this radiology report.





Abdominal Pain Fem Course/Dx





- Course


Course Of Treatment: DISCUSSED RESULTS WITH THE PATIENT.  AFTER SOAP SUDS ENEMA

, PATIENT DID HAVE SOME BM AND FEELS IMPROVED.  HOME WITH MAG [facility] AND A 

FLEETS ENEMA.  F/U PMD; RETURN IF WORSE.





- Diagnoses


Provider Diagnoses: 


 Constipation, Abdominal pain








Discharge





- Sign-Out/Discharge


Documenting (check all that apply): Patient Departure - DC





- Discharge Plan


Condition: Stable


Disposition: HOME


Patient Education Materials:  Constipation (ED), Acute Abdominal Pain (ED)


Referrals: 


Serjio Chaudhry MD [Primary Care Provider] - 


Additional Instructions: 


FOLLOW UP WITH YOUR DOCTOR IF NOT COMPLETELY IMPROVED.


RETURN TO THE EMERGENCY DEPARTMENT FOR ANY WORSENING OF YOUR CONDITION OR 

QUESTIONS OR CONCERNS.





- Billing Disposition and Condition


Condition: STABLE


Disposition: Home





- Attestation Statements


Document Initiated by Jenniibjefferson: Yes


Documenting Scribe: Eden Chacon 


Provider For Whom Vicki is Documenting (Include Credential): Dr. Jerry Ryan MD 


Scribe Attestation: 


Eden ROSS , scribed for Dr. Jerry Ryan MD  on 10/27/18 at 1856. 


Scribe Documentation Reviewed: Yes


Provider Attestation: 


The documentation as recorded by the Edne shirley  accurately 

reflects the service I personally performed and the decisions made by me, Dr. Jerry Ryan MD

## 2018-10-27 NOTE — RAD
INDICATION:  Periumbilical abdominal pain.



COMPARISON:  Comparison is made with a prior CT of the abdomen and pelvis from August 29, 2018.



TECHNIQUE: A CT scan of the abdomen and pelvis was performed without intravenous and with

oral contrast. Contiguous axial sections were obtained from the lung bases through the

symphysis pubis. Images were reconstructed in the coronal and sagittal planes.



FINDINGS:  



LUNG BASES: There is an interstitial infiltrate in the right paramediastinal region which

is unchanged from the prior exam suggestive of fibrosis.  No pleural effusion is present.



LIVER: The liver is normal in size. No significant focal abnormality is seen on this

noncontrast study.



GALLBLADDER: No calcified gallstones are seen.



BILE DUCTS: No intra or extrahepatic ductal distention is seen.



SPLEEN: The spleen is normal in size without significant focal abnormality.



PANCREAS: The pancreas is normal in size. No ductal distention or calcifications are seen.



ADRENAL GLANDS: The adrenal glands are normal in size.



KIDNEYS: The kidneys are normal in size. No renal calculi or hydronephrosis is seen.



AORTA: The aorta is normal in caliber with mild calcific plaque present.



LYMPH NODES: No significantly enlarged lymph nodes are seen.



BOWEL: There is a moderate size hiatal hernia. The stomach, small and large bowel appear

nondistended.  The appendix appears to be within normal limits.  There are scattered

diverticuli within the colon. There is no evidence for diverticulitis or colitis. There is

a small periumbilical hernia containing fat.



PELVIC ORGANS: No bladder wall thickening is seen. The uterus is retroverted in position

and normal in size.



PERITONEUM: No free intraperitoneal air or fluid is seen.



BONES: There is bilateral spondylolysis at the L5 level and grade II anterior

spondylolisthesis at the L5-S1 level which appears unchanged.



IMPRESSION:  

1. NO EVIDENCE FOR ACUTE FINDING.

2. FINDINGS SUGGESTIVE OF PARAMEDIASTINAL FIBROSIS, UNCHANGED.

3. MODERATE SIZE HIATAL HERNIA, UNCHANGED.

4. SMALL PERIUMBILICAL HERNIA CONTAINING FAT, UNCHANGED.

5. GRADE II ANTERIOR SPONDYLOLISTHESIS AT THE L5-S1 LEVEL, UNCHANGED.

## 2018-11-05 ENCOUNTER — HOSPITAL ENCOUNTER (EMERGENCY)
Dept: HOSPITAL 25 - ED | Age: 63
Discharge: HOME | End: 2018-11-05
Payer: COMMERCIAL

## 2018-11-05 VITALS — SYSTOLIC BLOOD PRESSURE: 115 MMHG | DIASTOLIC BLOOD PRESSURE: 84 MMHG

## 2018-11-05 DIAGNOSIS — Z91.048: ICD-10-CM

## 2018-11-05 DIAGNOSIS — Z87.891: ICD-10-CM

## 2018-11-05 DIAGNOSIS — Z88.2: ICD-10-CM

## 2018-11-05 DIAGNOSIS — G43.109: Primary | ICD-10-CM

## 2018-11-05 LAB
BASOPHILS # BLD AUTO: 0 10^3/UL (ref 0–0.2)
EOSINOPHIL # BLD AUTO: 0.1 10^3/UL (ref 0–0.6)
HCT VFR BLD AUTO: 38 % (ref 35–47)
HGB BLD-MCNC: 12.9 G/DL (ref 12–16)
LYMPHOCYTES # BLD AUTO: 1.7 10^3/UL (ref 1–4.8)
MCH RBC QN AUTO: 31 PG (ref 27–31)
MCHC RBC AUTO-ENTMCNC: 34 G/DL (ref 31–36)
MCV RBC AUTO: 90 FL (ref 80–97)
MONOCYTES # BLD AUTO: 0.5 10^3/UL (ref 0–0.8)
NEUTROPHILS # BLD AUTO: 3 10^3/UL (ref 1.5–7.7)
NRBC # BLD AUTO: 0 10^3/UL
NRBC BLD QL AUTO: 0.1
PLATELET # BLD AUTO: 250 10^3/UL (ref 150–450)
RBC # BLD AUTO: 4.18 10^6/UL (ref 4–5.4)
WBC # BLD AUTO: 5.3 10^3/UL (ref 3.5–10.8)

## 2018-11-05 PROCEDURE — 70450 CT HEAD/BRAIN W/O DYE: CPT

## 2018-11-05 PROCEDURE — 99282 EMERGENCY DEPT VISIT SF MDM: CPT

## 2018-11-05 PROCEDURE — 80053 COMPREHEN METABOLIC PANEL: CPT

## 2018-11-05 PROCEDURE — 36415 COLL VENOUS BLD VENIPUNCTURE: CPT

## 2018-11-05 PROCEDURE — 85025 COMPLETE CBC W/AUTO DIFF WBC: CPT

## 2018-11-05 NOTE — ED
Headache





- HPI Summary


HPI Summary: 





Patient complains of geometrical patterns in bilateral vision fields, HA behind 

bilateral eyes, nausea, fatigue 1 hour.  Also complains of episode of weakness 

to left arm which she states is chronic and intermittent.  Weakness of left arm 

is currently resolved, headache has persisted. Hx of headache with pain behind 

her eyes.  States geometrical patterns removed.  Denies fever, cough, sore 

throat, neck stiffness, CP, SOB, N/V/D, abdominal pain, change in urine, change 

in BM.  Patient states history of possible PE, lung carcinoma, fibromyalgia, 

anxiety, GERD, hypothyroid.





- History Of Current Complaint


Chief Complaint: EDNeurologicalDeficit


Stated Complaint: VISION PROBLEM


Time Seen by Provider: 11/05/18 14:38


Hx Obtained From: Patient


Onset/Duration: Sudden Onset


Initially Headache Was: Moderate


Currently Pain Is: Moderate


Timing: Constant


Character: Pressure, Typical Headache


Location of Headache: Frontal


Aggravating Factor: Nothing


Allevating Factors: Nothing


Associated Signs And Symptoms: Nausea, Visual Changes





- Allergies/Home Medications


Allergies/Adverse Reactions: 


 Allergies











Allergy/AdvReac Type Severity Reaction Status Date / Time


 


adhesive tape Allergy  Rash Verified 11/05/18 13:31


 


Sulfa (Sulfonamide Allergy  Hives Verified 11/05/18 13:31





Antibiotics)     














PMH/Surg Hx/FS Hx/Imm Hx


Endocrine/Hematology History: Reports: Hx Thyroid Disease - HYPOTHYROID


   Denies: Hx Diabetes, Hx Anemia


Cardiovascular History: Reports: Hx Valvular Heart Disease - MV prolapse, Other 

Cardiovascular Problems/Disorders - VALVULAR HEART DISEASE(MV PROLAPSE)


   Denies: Hx Congestive Heart Failure, Hx Hypertension, Hx Pacemaker/ICD


Respiratory History: Reports: Hx Chronic Obstructive Pulmonary Disease (COPD), 

Other Respiratory Problems/Disorders - LUNG CA, PT.STATES?LT.LUNG, JUST DX'D 

EARLIER THIS YEAR


   Denies: Hx Asthma


GI History: Reports: Hx Gastroesophageal Reflux Disease, Hx Hiatal Hernia, 

Other GI Disorders - GERD, ? IBS


   Denies: Hx Jaundice


 History: 


   Denies: Hx Dialysis, Hx Renal Disease


Musculoskeletal History: Reports: Hx Arthritis - osteoarthritis in lower back, 

Hx Orthopedic Injury


Sensory History: Reports: Hx Contacts or Glasses


   Denies: Hx Hearing Aid


Opthamlomology History: Reports: Hx Contacts or Glasses


Neurological History: Reports: Hx Nerve Disease - fibromyalgia, Other Neuro 

Impairments/Disorders - L5-S1 PAIN INJECTION


Psychiatric History: Reports: Hx Anxiety, Hx Depression


   Denies: Hx Panic Disorder





- Cancer History


Cancer Type, Location and Year: Lung Cancer


Hx Chemotherapy: Yes - Lung Cancer-2017


Hx Radiation Therapy: Yes





- Surgical History


Surgery Procedure, Year, and Place: SEVERAL FOOT SURGERIES-([facility]) [facility].  MENISCUS 

REPAIR- Lt [facility] -.  Partial thyroidectomy (cyst).  Rt HAND- REMOVED A PIECE OF 

BONE


Hx Anesthesia Reactions: No





- Immunization History


Date of Tetanus Vaccine: unk


Date of Influenza Vaccine: unk


Infectious Disease History: No


Infectious Disease History: 


   Denies: Traveled Outside the [place] in Last 30 Days





- Family History


Known Family History: Positive: Renal Disease, Other - arthritis, CA





- Social History


Alcohol Use: Occasionally


Alcohol Amount: 2-3 beers a couple times per week- pt seems evasive


Hx Substance Use: No


Substance Use Type: Reports: None


Substance Use Comment - Amount & Last Used: Tramadol ER


Hx Tobacco Use: Yes


Smoking Status (MU): Former Smoker


Type: Cigarettes


Amount Used/How Often: 1 ppd


Have You Smoked in the Last Year: Yes





Review of Systems


Positive: Fatigue


Eyes: Negative


ENT: Negative


Cardiovascular: Negative


Respiratory: Negative


Gastrointestinal: Negative


Genitourinary: Negative


Musculoskeletal: Negative


Skin: Negative


Positive: Headache, Weakness


Psychological: Normal


All Other Systems Reviewed And Are Negative: Yes





Physical Exam





- Summary


Physical Exam Summary: 





Neuro exam normal.  Including weakness in left arm and  strength or 

function.


Triage Information Reviewed: Yes


Vital Signs On Initial Exam: 


 Initial Vitals











Temp Pulse Resp BP Pulse Ox


 


 98.9 F   84   16   162/96   96 


 


 11/05/18 13:25  11/05/18 13:25  11/05/18 13:25  11/05/18 13:25  11/05/18 13:25











Vital Signs Reviewed: Yes


Appearance: Positive: Well-Appearing


Skin: Positive: Warm


Head/Face: Positive: Normal Head/Face Inspection


Eyes: Positive: Normal


Neck: Positive: Supple


Respiratory/Lung Sounds: Positive: Clear to Auscultation


Cardiovascular: Positive: Normal


Abdomen Description: Positive: Nontender


Musculoskeletal: Positive: Normal


Neurological: Positive: Normal


Psychiatric: Positive: Normal


AVPU Assessment: Alert





- Maurisio Coma Scale


Best Eye Response: 4 - Spontaneous


Best Motor Response: 6 - Obeys Commands


Best Verbal Response: 5 - Oriented


Coma Scale Total: 15





Diagnostics





- Vital Signs


 Vital Signs











  Temp Pulse Resp BP Pulse Ox


 


 11/05/18 13:25  98.9 F  84  16  162/96  96














- Laboratory


Lab Results: 


 Lab Results











  11/05/18 11/05/18 Range/Units





  14:42 14:42 


 


WBC  5.3   (3.5-10.8)  10^3/ul


 


RBC  4.18   (4.00-5.40)  10^6/ul


 


Hgb  12.9   (12.0-16.0)  g/dl


 


Hct  38   (35-47)  %


 


MCV  90   (80-97)  fL


 


MCH  31   (27-31)  pg


 


MCHC  34   (31-36)  g/dl


 


RDW  14   (10.5-15)  %


 


Plt Count  250   (150-450)  10^3/ul


 


MPV  7.1 L   (7.4-10.4)  fL


 


Neut % (Auto)  56.7   (38-83)  %


 


Lymph % (Auto)  31.6   (25-47)  %


 


Mono % (Auto)  8.9 H   (0-7)  %


 


Eos % (Auto)  2.0   (0-6)  %


 


Baso % (Auto)  0.8   (0-2)  %


 


Absolute Neuts (auto)  3.0   (1.5-7.7)  10^3/ul


 


Absolute Lymphs (auto)  1.7   (1.0-4.8)  10^3/ul


 


Absolute Monos (auto)  0.5   (0-0.8)  10^3/ul


 


Absolute Eos (auto)  0.1   (0-0.6)  10^3/ul


 


Absolute Basos (auto)  0   (0-0.2)  10^3/ul


 


Absolute Nucleated RBC  0   10^3/ul


 


Nucleated RBC %  0.1   


 


Sodium   138  (135-145)  mmol/L


 


Potassium   4.2  (3.5-5.0)  mmol/L


 


Chloride   102  (101-111)  mmol/L


 


Carbon Dioxide   30  (22-32)  mmol/L


 


Anion Gap   6  (2-11)  mmol/L


 


BUN   13  (6-24)  mg/dL


 


Creatinine   0.70  (0.51-0.95)  mg/dL


 


Est GFR ( Amer)   102.9  (>60)  


 


Est GFR (Non-Af Amer)   85.1  (>60)  


 


BUN/Creatinine Ratio   18.6  (8-20)  


 


Glucose   95  ()  mg/dL


 


Calcium   8.9  (8.6-10.3)  mg/dL


 


Total Bilirubin   0.40  (0.2-1.0)  mg/dL


 


AST   12 L  (13-39)  U/L


 


ALT   12  (7-52)  U/L


 


Alkaline Phosphatase   78  ()  U/L


 


Total Protein   6.8  (6.4-8.9)  g/dL


 


Albumin   4.0  (3.2-5.2)  g/dL


 


Globulin   2.8  (2-4)  g/dL


 


Albumin/Globulin Ratio   1.4  (1-3)  











Result Diagrams: 


 11/05/18 14:42





 11/05/18 14:42


Lab Statement: Any lab studies that have been ordered have been reviewed, and 

results considered in the medical decision making process.





Headache Course/Dx





- Course


Course Of Treatment: Patient complains of geometrical patterns in bilateral 

vision fields, HA behind bilateral eyes, nausea, fatigue 1 hour.  Also 

complains of episode of weakness to left arm which she states is chronic and 

intermittent.  Weakness of left arm is currently resolved, headache has 

persisted. Hx of headache with pain behind her eyes.  States geometrical 

patterns removed.  Denies fever, cough, sore throat, neck stiffness, CP, SOB, N/

V/D, abdominal pain, change in urine, change in BM.  Patient states history of 

possible PE, lung carcinoma, fibromyalgia, anxiety, GERD, hypothyroid.  

Physical exam:Neuro exam normal.  Including weakness in left arm and  

strength or function.  Vital signs within normal limits.  Labs unremarkable.  

CT negative.   Neuro exam normal.  Symptoms consistent with migraine headache.  

Patient given migraine.  Cocktail with subsequent improvement in symptoms.





- Diagnoses


Provider Diagnoses: 


 Migraine headache with aura








Discharge





- Sign-Out/Discharge


Documenting (check all that apply): Patient Departure





- Discharge Plan


Condition: Stable


Disposition: HOME


Patient Education Materials:  Migraine Headache (ED)


Referrals: 


Serjio Chaudhry MD [Primary Care Provider] - 


Additional Instructions: 


Follow-up with primary care.  Return to the ED for any new or worsening symptoms





- Billing Disposition and Condition


Condition: STABLE


Disposition: Home

## 2023-05-18 NOTE — ED
[facility], scribed for Omar Paiz MD on 01/18/17 at 2013 .





HPI Chest Pain





- HPI Summary


HPI Summary: 


Patient is a 62 y/o female coming to [facility] presenting with burning chest pain 

that began this morning. It has radiated to her back and is described as a 

sharp pain there, especially severe on her left side, radiating downwards. The 

pain is exacerbated by deep breaths and movement. Denies nausea, vomiting, 

diarrhea, and constipation. She ate oatmeal and Chinese food today. She had a 

recent upper ear infection that was treated with antibiotics. She had a fall 

last week but did not hurt anything at the time. 





- History of Current Complaint


Chief Complaint: EDChestPainROMI


Time Seen by Provider: 01/18/17 20:06


Hx Obtained From: Patient


Onset/Duration: Started Hours Ago, Still Present


Pain Intensity: 7


Pain Scale Used: 0-10 Numeric


Chest Pain Radiates: Yes


Chest Pain Radiates To:: Back


Character: Burning





- Allergy/Home Medications


Allergies/Adverse Reactions: 


 Allergies











Allergy/AdvReac Type Severity Reaction Status Date / Time


 


Sulfa Drugs Allergy Intermediate Hives Verified 01/11/17 14:59














PMH/Surg Hx/FS Hx/Imm Hx


Endocrine/Hematology History: Reports: Hx Thyroid Disease - HYPOTHYROID


   Denies: Hx Diabetes, Hx Anemia


Cardiovascular History: Reports: Hx Valvular Heart Disease - mitral valve 

prolapse


   Denies: Hx Congestive Heart Failure, Hx Hypertension, Hx Pacemaker/ICD


GI History: Reports: Hx Gastroesophageal Reflux Disease - ON MEDICATION FOR, 

Other GI Disorders - GERD, ? IBS


   Denies: Hx Jaundice


 History: 


   Denies: Hx Dialysis, Hx Renal Disease


Musculoskeletal History: Reports: Hx Arthritis - OSTEO, BACK AND KNEES, Hx 

Orthopedic Injury


Sensory History: Reports: Hx Contacts or Glasses


   Denies: Hx Hearing Aid


Opthamlomology History: Reports: Hx Contacts or Glasses


Neurological History: Reports: Other Neuro Impairments/Disorders - L5-S1 PAIN 

INJECTION


Psychiatric History: Reports: Hx Anxiety - ON MEDICATION FOR, Hx Depression - 

ON MEDICATION FOR


   Denies: Hx Panic Disorder





- Surgical History


Surgery Procedure, Year, and Place: SEVERAL FOOT SURGERIES-([facility]) [facility] MENISCUS 

REPAIR- [facility] -Partial thyroidectomy (cyst) Rt HAND -


Hx Anesthesia Reactions: No


Infectious Disease History: No


Infectious Disease History: 


   Denies: Traveled Outside the [place] in Last 30 Days





- Family History


Known Family History: Positive: Renal Disease, Other - arthritis, CA





- Social History


Alcohol Use: Occasionally


Substance Use Type: Reports: Prescribed


Substance Use Comment - Amount & Last Used: Tramadol ER


Smoking Status (MU): Heavy Every Day Tobacco Smoker


Type: Cigarettes


Amount Used/How Often: 1 ppd


Have You Smoked in the Last Year: Yes





Review of Systems


Positive: Chest Pain


Positive: Myalgia - back pain


All Other Systems Reviewed And Are Negative: Yes





Physical Exam





- Summary


Physical Exam Summary: 





VITAL SIGNS: Reviewed. 


GENERAL: Patient is an obese female who is lying comfortable in the stretcher. 

Patient is not in any acute respiratory distress. 


HEAD AND FACE: No signs of trauma. No ecchymosis, hematomas or skull 

depressions. No sinus tenderness. 


EYES: PERRLA, EOMI x 2, No injected conjunctiva, no nystagmus.


EARS: Hearing grossly intact. Ear canals and tympanic membranes are within 

normal limits. 


MOUTH: Oropharynx within normal limits. 


NECK: Supple, trachea is midline, no adenopathy, no JVD, no carotid bruit, no c-

spine tenderness, neck with full ROM.


CHEST: Symmetric, no tenderness at palpation 


LUNGS: Clear to auscultation bilaterally. No wheezing or crackles.


CVS: Regular rate and rhythm, S1 and S2 present, no murmurs or gallops 

appreciated. 


ABDOMEN: Soft, non-tender. No signs of distention. No rebound no guarding, and 

no masses palpated. Bowel sounds are normal. 


EXTREMITIES: FROM in all major joints, no edema, no cyanosis or clubbing.


NEURO: Alert and oriented x 3. No acute neurological deficits. Speech is normal 

and follows commands. 


SKIN: Dry and warm


Back: No vertebral tenderness. positive muscle spasm and tenderness in the left 

upper back. 





Triage Information Reviewed: Yes


Vital Signs On Initial Exam: 


 Initial Vitals











Temp Pulse Resp BP Pulse Ox


 


 97.6 F   85   16   121/64   99 


 


 01/18/17 18:52  01/18/17 18:52  01/18/17 18:52  01/18/17 18:52  01/18/17 18:52











Vital Signs Reviewed: Yes





Diagnostics





- Vital Signs


 Vital Signs











  Temp Pulse Resp BP Pulse Ox


 


 01/18/17 18:52  97.6 F  85  16  121/64  99














- Laboratory


Lab Results: 


 Lab Results











  01/18/17 01/18/17 01/18/17 Range/Units





  20:27 20:27 20:27 


 


WBC   10.6   (3.5-10.8)  10^3/ul


 


RBC   4.80   (4.0-5.4)  10^6/ul


 


Hgb   12.3   (12.0-16.0)  g/dl


 


Hct   38   (35-47)  %


 


MCV   80   (80-97)  fL


 


MCH   26 L   (27-31)  pg


 


MCHC   32   (31-36)  g/dl


 


RDW   17 H   (10.5-15)  %


 


Plt Count   248   (150-450)  10^3/ul


 


MPV   8   (7.4-10.4)  um3


 


Neut % (Auto)   49.4   (38-83)  %


 


Lymph % (Auto)   42.7   (25-47)  %


 


Mono % (Auto)   6.3   (1-9)  %


 


Eos % (Auto)   1.2   (0-6)  %


 


Baso % (Auto)   0.4   (0-2)  %


 


Absolute Neuts (auto)   5.2   (1.5-7.7)  10^3/ul


 


Absolute Lymphs (auto)   4.5   (1.0-4.8)  10^3/ul


 


Absolute Monos (auto)   0.7   (0-0.8)  10^3/ul


 


Absolute Eos (auto)   0.1   (0-0.6)  10^3/ul


 


Absolute Basos (auto)   0   (0-0.2)  10^3/ul


 


Absolute Nucleated RBC   0   10^3/ul


 


Nucleated RBC %   0   


 


Sodium  137    (133-145)  mmol/L


 


Potassium  3.8    (3.5-5.0)  mmol/L


 


Chloride  101    (101-111)  mmol/L


 


Carbon Dioxide  28    (22-32)  mmol/L


 


Anion Gap  8    (2-11)  mmol/L


 


BUN  8    (6-24)  mg/dL


 


Creatinine  0.63    (0.51-0.95)  mg/dL


 


Est GFR ( Amer)  124.0    (>60)  


 


Est GFR (Non-Af Amer)  96.4    (>60)  


 


BUN/Creatinine Ratio  12.7    (8-20)  


 


Glucose  89    ()  mg/dL


 


Lactic Acid     (0.5-2.0)  mmol/L


 


Calcium  9.1    (8.6-10.3)  mg/dL


 


Magnesium  1.8 L    (1.9-2.7)  mg/dL


 


Total Bilirubin  0.40    (0.2-1.0)  mg/dL


 


AST  13    (13-39)  U/L


 


ALT  14    (7-52)  U/L


 


Alkaline Phosphatase  87    ()  U/L


 


CK-MB (CK-2)  3.3    (0.6-6.3)  ng/mL


 


Troponin I  0.00    (<0.04)  ng/mL


 


Total Protein  7.1    (6.4-8.9)  g/dL


 


Albumin  4.0    (3.2-5.2)  g/dL


 


Globulin  3.1    (2-4)  g/dL


 


Albumin/Globulin Ratio  1.3    (1-3)  


 


Amylase  18 L    ()  U/L


 


Lipase  11    (11.0-82.0)  U/L


 


Urine Color    Straw  


 


Urine Appearance    Clear  


 


Urine pH    6.0  (5-9)  


 


Ur Specific Gravity    1.004 L  (1.010-1.030)  


 


Urine Protein    Negative  (Negative)  


 


Urine Ketones    Negative  (Negative)  


 


Urine Blood    Negative  (Negative)  


 


Urine Nitrate    Negative  (Negative)  


 


Urine Bilirubin    Negative  (Negative)  


 


Urine Urobilinogen    Negative  (Negative)  


 


Ur Leukocyte Esterase    Negative  (Negative)  


 


Urine Glucose    Negative  (Negative)  














  01/18/17 Range/Units





  20:27 


 


WBC   (3.5-10.8)  10^3/ul


 


RBC   (4.0-5.4)  10^6/ul


 


Hgb   (12.0-16.0)  g/dl


 


Hct   (35-47)  %


 


MCV   (80-97)  fL


 


MCH   (27-31)  pg


 


MCHC   (31-36)  g/dl


 


RDW   (10.5-15)  %


 


Plt Count   (150-450)  10^3/ul


 


MPV   (7.4-10.4)  um3


 


Neut % (Auto)   (38-83)  %


 


Lymph % (Auto)   (25-47)  %


 


Mono % (Auto)   (1-9)  %


 


Eos % (Auto)   (0-6)  %


 


Baso % (Auto)   (0-2)  %


 


Absolute Neuts (auto)   (1.5-7.7)  10^3/ul


 


Absolute Lymphs (auto)   (1.0-4.8)  10^3/ul


 


Absolute Monos (auto)   (0-0.8)  10^3/ul


 


Absolute Eos (auto)   (0-0.6)  10^3/ul


 


Absolute Basos (auto)   (0-0.2)  10^3/ul


 


Absolute Nucleated RBC   10^3/ul


 


Nucleated RBC %   


 


Sodium   (133-145)  mmol/L


 


Potassium   (3.5-5.0)  mmol/L


 


Chloride   (101-111)  mmol/L


 


Carbon Dioxide   (22-32)  mmol/L


 


Anion Gap   (2-11)  mmol/L


 


BUN   (6-24)  mg/dL


 


Creatinine   (0.51-0.95)  mg/dL


 


Est GFR ( Amer)   (>60)  


 


Est GFR (Non-Af Amer)   (>60)  


 


BUN/Creatinine Ratio   (8-20)  


 


Glucose   ()  mg/dL


 


Lactic Acid  1.1  (0.5-2.0)  mmol/L


 


Calcium   (8.6-10.3)  mg/dL


 


Magnesium   (1.9-2.7)  mg/dL


 


Total Bilirubin   (0.2-1.0)  mg/dL


 


AST   (13-39)  U/L


 


ALT   (7-52)  U/L


 


Alkaline Phosphatase   ()  U/L


 


CK-MB (CK-2)   (0.6-6.3)  ng/mL


 


Troponin I   (<0.04)  ng/mL


 


Total Protein   (6.4-8.9)  g/dL


 


Albumin   (3.2-5.2)  g/dL


 


Globulin   (2-4)  g/dL


 


Albumin/Globulin Ratio   (1-3)  


 


Amylase   ()  U/L


 


Lipase   (11.0-82.0)  U/L


 


Urine Color   


 


Urine Appearance   


 


Urine pH   (5-9)  


 


Ur Specific Gravity   (1.010-1.030)  


 


Urine Protein   (Negative)  


 


Urine Ketones   (Negative)  


 


Urine Blood   (Negative)  


 


Urine Nitrate   (Negative)  


 


Urine Bilirubin   (Negative)  


 


Urine Urobilinogen   (Negative)  


 


Ur Leukocyte Esterase   (Negative)  


 


Urine Glucose   (Negative)  











Result Diagrams: 


 01/18/17 20:27





 01/18/17 20:27


Lab Statement: Any lab studies that have been ordered have been reviewed, and 

results considered in the medical decision making process.





- Radiology


  ** CXR


Xray Interpretation: No Acute Changes


Radiology Interpretation Completed By: Radiologist - IMPRESSION: Stigmata of 

probable chronic obstructive pulmonary disease. No acute cardiopulmonary 

process evident.





- EKG


  ** 18:44


Cardiac Rate: NL - 84 bpm


EKG Rhythm: Sinus Rhythm


EKG Interpretation: No S/T elevation





Re-Evaluation





- Re-Evaluation


  ** First Eval


Re-Evaluation Time: 23:43


Change: Improved - Feeling better and ready to go home.





Chest Pain Course/Dx





- Course


Assessment/Plan: Patient is a 62 y/o female coming to [facility] presenting with 

burning chest pain that began this morning. It has radiated to her back and is 

described as a sharp pain there, especially severe on her left side, radiating 

downwards. The pain is exacerbated by deep breaths and movement. Denies nausea, 

vomiting, diarrhea, and constipation. She ate oatmeal and Chinese food today. 

She had a recent upper ear infection that was treated with antibiotics. She had 

a fall last week but did not hurt anything at the time.  Blood work wnl. Except 

for Magnesium of 1.8. She was given PO magnesium.  CXR: No acute 

cardiopulmonary disease.  P/E patient has mild epigastric tenderness for which 

she was given Pepcid and her symptoms improved. She has muscle tenderness in 

the right upper back. She was given Toradol and robaxin. After medications all 

her symptoms have improved.  I discussed all the findings and test results with 

the patient and patient. Patient was   instructed to return to the emergency 

room immediately if any of the symptoms return or worsens. They understand and 

agree.  They were explained the possibility of an early abdominal pathology 

which was not detected at this time despite the physical exam and testing. They 

understand and agree.  Abdominal exam before discharge: Soft,NT. No signs of 

distention. BS present. No rebound no guarding, and no masses palpated. Patient 

is alert and oriented. Patient is hemodynamically stable. Patient is to follow 

up with primary care physician in the next 24 hours. Patient and patients 

parents agree and understands.





- Chest Pain


Differential Diagnosis/HQI/PQRI: ACS, Angina, Chest Wall, Lower Respiratory 

Infection





- Diagnoses


Provider Diagnoses: 


 Abdominal pain, Back pain








Discharge





- Discharge Plan


Condition: Stable


Disposition: HOME


Prescriptions: 


Famotidine TAB* [Pepcid TAB*] 20 mg PO BID #20 tab


oxyCODONE/Acetamin 5/325 MG* [Percocet 5/325 TAB*] 1 tab PO Q6H PRN #10 tab MDD 

max 4 tabs /day


 PRN Reason: Pain


Patient Education Materials:  Famotidine (By mouth), Oxycodone/Acetaminophen (

By mouth), Abdominal Pain (ED), Back Pain (ED)


Referrals: 


Serjio Chaudhry MD [Primary Care Provider] - 


Additional Instructions: 


Follow up with primary care physician within 24 hours. Return to the emergency 

department for changing or worsening symptoms.





The documentation as recorded by the [facility] Anna accurately reflects 

the service I personally performed and the decisions made by Chencho hope Walter, MD.
No

## 2024-05-03 NOTE — RAD
Indication: Head injury.



CT of the brain was performed without IV contrast. Comparison is made with previous exam

dated November 28, 2017. Prior MRI dated February 01, 2018 was also reviewed.



Ventricular structures are midline. No midline shift is noted. The extra-axial spaces are

unremarkable. There is no evidence of intracranial mass or hemorrhage. There is a

hypodensity likely representing encephalomalacia in the right parietal lobe.



Mastoid air cells and paranasal sinuses are otherwise unremarkable. No fracture is noted.



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 No intracranial mass or hemorrhage is noted. abdominal pain